# Patient Record
Sex: MALE | Race: WHITE | URBAN - METROPOLITAN AREA
[De-identification: names, ages, dates, MRNs, and addresses within clinical notes are randomized per-mention and may not be internally consistent; named-entity substitution may affect disease eponyms.]

---

## 2017-02-13 ENCOUNTER — OFFICE VISIT (OUTPATIENT)
Dept: PEDIATRICS | Facility: CLINIC | Age: 82
End: 2017-02-13
Payer: COMMERCIAL

## 2017-02-13 VITALS
SYSTOLIC BLOOD PRESSURE: 137 MMHG | OXYGEN SATURATION: 100 % | DIASTOLIC BLOOD PRESSURE: 65 MMHG | HEART RATE: 64 BPM | HEIGHT: 63 IN | BODY MASS INDEX: 23.92 KG/M2 | TEMPERATURE: 97.2 F | WEIGHT: 135 LBS

## 2017-02-13 DIAGNOSIS — E78.5 HYPERLIPIDEMIA LDL GOAL <70: ICD-10-CM

## 2017-02-13 DIAGNOSIS — I10 HTN, GOAL BELOW 150/90: Primary | ICD-10-CM

## 2017-02-13 DIAGNOSIS — D69.3 IDIOPATHIC THROMBOCYTOPENIC PURPURA (H): ICD-10-CM

## 2017-02-13 DIAGNOSIS — Z23 NEED FOR PROPHYLACTIC VACCINATION AND INOCULATION AGAINST INFLUENZA: ICD-10-CM

## 2017-02-13 LAB
ALT SERPL W P-5'-P-CCNC: 20 U/L (ref 0–70)
ANION GAP SERPL CALCULATED.3IONS-SCNC: 4 MMOL/L (ref 3–14)
BASOPHILS # BLD AUTO: 0 10E9/L (ref 0–0.2)
BASOPHILS NFR BLD AUTO: 0.4 %
BUN SERPL-MCNC: 13 MG/DL (ref 7–30)
CALCIUM SERPL-MCNC: 8.9 MG/DL (ref 8.5–10.1)
CHLORIDE SERPL-SCNC: 106 MMOL/L (ref 94–109)
CHOLEST SERPL-MCNC: 138 MG/DL
CO2 SERPL-SCNC: 30 MMOL/L (ref 20–32)
CREAT SERPL-MCNC: 0.92 MG/DL (ref 0.66–1.25)
DIFFERENTIAL METHOD BLD: ABNORMAL
EOSINOPHIL # BLD AUTO: 0.2 10E9/L (ref 0–0.7)
EOSINOPHIL NFR BLD AUTO: 2.6 %
ERYTHROCYTE [DISTWIDTH] IN BLOOD BY AUTOMATED COUNT: 15.3 % (ref 10–15)
GFR SERPL CREATININE-BSD FRML MDRD: 78 ML/MIN/1.7M2
GLUCOSE SERPL-MCNC: 71 MG/DL (ref 70–99)
HCT VFR BLD AUTO: 45.6 % (ref 40–53)
HDLC SERPL-MCNC: 54 MG/DL
HGB BLD-MCNC: 14.9 G/DL (ref 13.3–17.7)
LDLC SERPL CALC-MCNC: 67 MG/DL
LYMPHOCYTES # BLD AUTO: 1.8 10E9/L (ref 0.8–5.3)
LYMPHOCYTES NFR BLD AUTO: 26.2 %
MCH RBC QN AUTO: 31.1 PG (ref 26.5–33)
MCHC RBC AUTO-ENTMCNC: 32.7 G/DL (ref 31.5–36.5)
MCV RBC AUTO: 95 FL (ref 78–100)
MONOCYTES # BLD AUTO: 0.7 10E9/L (ref 0–1.3)
MONOCYTES NFR BLD AUTO: 10.5 %
NEUTROPHILS # BLD AUTO: 4.2 10E9/L (ref 1.6–8.3)
NEUTROPHILS NFR BLD AUTO: 60.3 %
NONHDLC SERPL-MCNC: 84 MG/DL
PLATELET # BLD AUTO: 105 10E9/L (ref 150–450)
POTASSIUM SERPL-SCNC: 4.3 MMOL/L (ref 3.4–5.3)
RBC # BLD AUTO: 4.79 10E12/L (ref 4.4–5.9)
SODIUM SERPL-SCNC: 140 MMOL/L (ref 133–144)
TRIGL SERPL-MCNC: 86 MG/DL
WBC # BLD AUTO: 7 10E9/L (ref 4–11)

## 2017-02-13 PROCEDURE — 85025 COMPLETE CBC W/AUTO DIFF WBC: CPT | Performed by: INTERNAL MEDICINE

## 2017-02-13 PROCEDURE — 80061 LIPID PANEL: CPT | Performed by: INTERNAL MEDICINE

## 2017-02-13 PROCEDURE — 90662 IIV NO PRSV INCREASED AG IM: CPT | Performed by: INTERNAL MEDICINE

## 2017-02-13 PROCEDURE — 84460 ALANINE AMINO (ALT) (SGPT): CPT | Performed by: INTERNAL MEDICINE

## 2017-02-13 PROCEDURE — 99214 OFFICE O/P EST MOD 30 MIN: CPT | Mod: 25 | Performed by: INTERNAL MEDICINE

## 2017-02-13 PROCEDURE — G0008 ADMIN INFLUENZA VIRUS VAC: HCPCS | Performed by: INTERNAL MEDICINE

## 2017-02-13 PROCEDURE — 36415 COLL VENOUS BLD VENIPUNCTURE: CPT | Performed by: INTERNAL MEDICINE

## 2017-02-13 PROCEDURE — 80048 BASIC METABOLIC PNL TOTAL CA: CPT | Performed by: INTERNAL MEDICINE

## 2017-02-13 ASSESSMENT — PAIN SCALES - GENERAL: PAINLEVEL: MILD PAIN (3)

## 2017-02-13 NOTE — PROGRESS NOTES
SUBJECTIVE:                                                    Justin Doan is a 84 year old male who presents to clinic today for the following health issues:      Hyperlipidemia Follow-Up      Rate your low fat/cholesterol diet?: fair    Taking statin?  Yes, no muscle aches from statin    Other lipid medications/supplements?:  none     Hypertension Follow-up      Outpatient blood pressures are not being checked.    Low Salt Diet: no added salt     Vascular Disease Follow-up:  Coronary Artery Disease (CAD)      Chest pain or pressure, left side neck or arm pain: No    Shortness of breath/increased sweats/nausea with exertion: No    Pain in calves walking 1-2 blocks: Sometimes    Worsened or new symptoms since last visit: No    Nitroglycerin use: no    Daily aspirin use: No     Follow Up Memory issues      Amount of exercise or physical activity: None    Problems taking medications regularly: No    Medication side effects: none  Diet: low fat/cholesterol    No chest pain or shortness  Of breath.  Smoking.  Eating well.  No problems with bowels or urine.  Driving.    Not doing much through.  13th of February,   One more.  Memory not intact.        Problem list and histories reviewed & adjusted, as indicated.  Additional history: as documented    Patient Active Problem List   Diagnosis     COPD (chronic obstructive pulmonary disease) (H)     Renal artery stenosis (H)     PAD (peripheral artery disease) (H)     GERD (gastroesophageal reflux disease)     Insulin resistance     Tobacco abuse     Advanced directives, counseling/discussion     Hyperlipidemia LDL goal <70     CVD (cerebrovascular disease)     Health Care Home     Thrombocytopenia (H)     S/P carotid endarterectomy     Pseudophakia, ou     ITP (idiopathic thrombocytopenic purpura)     Abdominal aortic aneurysm (H)     Occlusion of superior mesenteric artery (H)     HTN, goal below 150/90     Short-term memory loss     Posterior vitreous detachment,  bilateral     Conjunctival cyst, left     Past Surgical History   Procedure Laterality Date     Ent surgery       nose surgery to open things up     Replace valve aortic minimally invasive  2/14/2012     Procedure:REPLACE VALVE AORTIC MINIMALLY INVASIVE; Minimally Invasive Aortic Valve Replacement on pump oxygenator , Trans-Esophageal Cardiac Echogram performed by ; Surgeon:EDEL SALAZAR; Location: OR  --- used porcine      Vascular surgery  03/12     stent placed in superior mesenteric artery for acute ischemia     Endarterectomy carotid  12/21/2012     Procedure: ENDARTERECTOMY CAROTID;  Left Carotid  Endarterectomy ;  Surgeon: Gio Villatoro MD;  Location:  OR     Phacoemulsification with standard intraocular lens implant  10/2013     right eye; left eye     Cataract iol, rt/lt         Social History   Substance Use Topics     Smoking status: Current Every Day Smoker     Packs/day: 0.50     Years: 60.00     Types: Cigarettes     Smokeless tobacco: Never Used      Comment: 7 cigs a day     Alcohol use No      Comment: rarely; consumed heavy use prior     Family History   Problem Relation Age of Onset     HEART DISEASE Father      mi age 72     HEART DISEASE Brother      MI age 44         Current Outpatient Prescriptions   Medication Sig Dispense Refill     donepezil (ARICEPT) 5 MG tablet Take 1 tablet (5 mg) by mouth At Bedtime 90 tablet 1     ezetimibe (ZETIA) 10 MG tablet Take 1 tablet (10 mg) by mouth daily For cholesterol control 90 tablet 4     omeprazole (PRILOSEC) 20 MG capsule Take 1 capsule (20 mg) by mouth daily 90 capsule 2     clopidogrel (PLAVIX) 75 MG tablet Take 1 tablet (75 mg) by mouth daily To reduce risk of stroke 90 tablet 3     simvastatin (ZOCOR) 40 MG tablet Take 1 tablet (40 mg) by mouth At Bedtime .  Due for cholesterol check. 90 tablet 4     metoprolol (TOPROL-XL) 100 MG 24 hr tablet Take 1 tablet (100 mg) by mouth daily For heart and blood pressure 90 tablet 3      capsaicin (ZOSTRIX) 0.075 % topical cream Apply topically 3 times daily 120 g 12     Capsicum Oleoresin 0.075 % CREA Apply to feet daily 60 g 12     capsaicin (CAPSAICIN APR) 0.025 % CREA Apply small amount on feet 3 times daily. 45 g 12     carboxymethylcellulose (REFRESH) 1 % ophthalmic solution 1 drop 3 times daily.       VITAMIN D 1000 UNIT OR TABS 1 TABLET DAILY 90 3     No Known Allergies  Recent Labs   Lab Test  02/10/16   1313  06/04/15   1005  02/25/15   1104  10/24/14   1045   08/14/13   0739   11/14/12   1018  08/14/12   1004   01/26/12   1430   07/06/11   0745   09/13/10   0956   A1C  5.9   --    --    --    --    --    --    --   5.9   --   5.8   < >  6.0   < >   --    LDL  62   --   98  75   --   79   < >  71  75   --    --    < >  68   < >   --    HDL   --    --    --    --    --   38*   --   37*   --    --    --    --   38*   < >   --    TRIG   --    --    --    --    --   69   --   84   --    --    --    --   54   < >   --    ALT   --    --   19  17   --   21   --   17  7   < >   --    < >  9   < >   --    CR  0.97  0.97  0.96  1.05   < >  1.05   < >  0.95  1.02   < >   --    < >  0.97   < >   --    GFRESTIMATED  74  74  75  68   < >  68   < >  76  70   < >   --    < >  75   < >   --    GFRESTBLACK  90  90  >90   GFR Calc    82   < >  82   < >  >90  85   < >   --    < >  >90   < >   --    POTASSIUM  4.3  4.4  4.6  5.1   < >  5.1   < >  4.2  5.0   < >   --    < >  4.9   < >   --    TSH   --   0.77   --    --    --    --    --    --    --    --    --    --    --    --   0.47    < > = values in this interval not displayed.      BP Readings from Last 3 Encounters:   02/13/17 137/65   08/12/16 131/72   05/10/16 145/75    Wt Readings from Last 3 Encounters:   02/13/17 135 lb (61.2 kg)   08/12/16 134 lb (60.8 kg)   05/10/16 142 lb (64.4 kg)                    ROS:  Constitutional, HEENT, cardiovascular, pulmonary, gi and gu systems are negative, except as otherwise noted.    OBJECTIVE:   "                                                  /65 (BP Location: Right arm, Patient Position: Chair, Cuff Size: Adult Regular)  Pulse 64  Temp 97.2  F (36.2  C) (Oral)  Ht 5' 2.5\" (1.588 m)  Wt 135 lb (61.2 kg)  SpO2 100%  BMI 24.3 kg/m2  Body mass index is 24.3 kg/(m^2).  GENERAL: healthy, alert and no distress  NECK: no adenopathy, no asymmetry, masses, or scars and thyroid normal to palpation  RESP: lungs clear to auscultation - no rales, rhonchi or wheezes  CV: regular rate and rhythm, normal S1 S2, no S3 or S4, no murmur, click or rub, no peripheral edema and peripheral pulses strong  ABDOMEN: soft, nontender, no hepatosplenomegaly, no masses and bowel sounds normal  MS: no gross musculoskeletal defects noted, no edema    Diagnostic Test Results:  Results for orders placed or performed in visit on 02/13/17   CBC with platelets and differential   Result Value Ref Range    WBC 7.0 4.0 - 11.0 10e9/L    RBC Count 4.79 4.4 - 5.9 10e12/L    Hemoglobin 14.9 13.3 - 17.7 g/dL    Hematocrit 45.6 40.0 - 53.0 %    MCV 95 78 - 100 fl    MCH 31.1 26.5 - 33.0 pg    MCHC 32.7 31.5 - 36.5 g/dL    RDW 15.3 (H) 10.0 - 15.0 %    Platelet Count 105 (L) 150 - 450 10e9/L    Diff Method Automated Method     % Neutrophils 60.3 %    % Lymphocytes 26.2 %    % Monocytes 10.5 %    % Eosinophils 2.6 %    % Basophils 0.4 %    Absolute Neutrophil 4.2 1.6 - 8.3 10e9/L    Absolute Lymphocytes 1.8 0.8 - 5.3 10e9/L    Absolute Monocytes 0.7 0.0 - 1.3 10e9/L    Absolute Eosinophils 0.2 0.0 - 0.7 10e9/L    Absolute Basophils 0.0 0.0 - 0.2 10e9/L        ASSESSMENT/PLAN:                                                              ICD-10-CM    1. HTN, goal below 150/90 I10 Basic metabolic panel   2. Hyperlipidemia LDL goal <70 E78.5 ALT     Lipid panel reflex to direct LDL     CANCELED: Basic metabolic panel     CANCELED: Lipid panel reflex to direct LDL     CANCELED: ALT   3. Idiopathic thrombocytopenic purpura (H) D69.3 CBC with platelets " and differential   4. Need for prophylactic vaccination and inoculation against influenza Z23 FLU VACCINE, INCREASED ANTIGEN, PRESV FREE, AGE 65+ [35897]     Vaccine Administration, Initial [68291]       Patient in need of labs  Patient still with short term memory loss.  I am concerned about his driving and memory.  Seems safe at home with access to food, clothing and keeps appointments.  Patient still smoking and there is a hint of alcohol odor today as well.      Arturo Ware MD  [facility]

## 2017-02-13 NOTE — NURSING NOTE
"Chief Complaint   Patient presents with     RECHECK     Memory issues     COPD     Hypertension     Lipids     Heart Problem       Initial /65 (BP Location: Right arm, Patient Position: Chair, Cuff Size: Adult Regular)  Pulse 64  Temp 97.2  F (36.2  C) (Oral)  Ht 5' 2.5\" (1.588 m)  Wt 135 lb (61.2 kg)  SpO2 100%  BMI 24.3 kg/m2 Estimated body mass index is 24.3 kg/(m^2) as calculated from the following:    Height as of this encounter: 5' 2.5\" (1.588 m).    Weight as of this encounter: 135 lb (61.2 kg).  Medication Reconciliation: complete, but referred to [facility] due to patient's memory issues.  Yasmeen Chaudhary MA      "

## 2017-02-13 NOTE — PROGRESS NOTES
Injectable Influenza Immunization Documentation    1.  Is the person to be vaccinated sick today?  No    2. Does the person to be vaccinated have an allergy to eggs or to a component of the vaccine?  No    3. Has the person to be vaccinated today ever had a serious reaction to influenza vaccine in the past?  No    4. Has the person to be vaccinated ever had Guillain-[place] syndrome?  No     Form completed by Carmella Raygoza CMA

## 2017-02-13 NOTE — MR AVS SNAPSHOT
"              After Visit Summary   2017    Justin Doan    MRN: [id number]           Patient Information     Date Of Birth          1932        Visit Information        Provider Department      2017 9:00 AM Arturo Ware MD [facility]        Today's Diagnoses     HTN, goal below 150/90    -  1    Hyperlipidemia LDL goal <70        Idiopathic thrombocytopenic purpura (H)           Follow-ups after your visit        Follow-up notes from your care team     Return in about 3 months (around 2017).      Who to contact     If you have questions or need follow up information about today's clinic visit or your schedule please contact [facility] directly at [phone].  Normal or non-critical lab and imaging results will be communicated to you by MyChart, letter or phone within 4 business days after the clinic has received the results. If you do not hear from us within 7 days, please contact the clinic through MyChart or phone. If you have a critical or abnormal lab result, we will notify you by phone as soon as possible.  Submit refill requests through [employer] or call your pharmacy and they will forward the refill request to us. Please allow 3 business days for your refill to be completed.          Additional Information About Your Visit        MyChart Information     [employer] lets you send messages to your doctor, view your test results, renew your prescriptions, schedule appointments and more. To sign up, go to [email or web address] . Click on \"Log in\" on the left side of the screen, which will take you to the Welcome page. Then click on \"Sign up Now\" on the right side of the page.     You will be asked to enter the access code listed below, as well as some personal information. Please follow the directions to create your username and password.     Your access code is: [email or web address]  Expires: 2017  9:23 AM     Your access code will  in " "90 days. If you need help or a new code, please call your [facility] or [phone].        Care EveryWhere ID     This is your Care EveryWhere ID. This could be used by other organizations to access your [place] medical records  [id number]        Your Vitals Were     Pulse Temperature Height Pulse Oximetry BMI (Body Mass Index)       64 97.2  F (36.2  C) (Oral) 5' 2.5\" (1.588 m) 100% 24.3 kg/m2        Blood Pressure from Last 3 Encounters:   02/13/17 137/65   08/12/16 131/72   05/10/16 145/75    Weight from Last 3 Encounters:   02/13/17 135 lb (61.2 kg)   08/12/16 134 lb (60.8 kg)   05/10/16 142 lb (64.4 kg)              We Performed the Following     ALT     Basic metabolic panel     CBC with platelets and differential     Lipid panel reflex to direct LDL        Primary Care Provider Office Phone # Fax #    Arturo Ware [phone]       [facility] [address] [facility] [address]        Thank you!     Thank you for choosing [facility]  for your care. Our goal is always to provide you with excellent care. Hearing back from our patients is one way we can continue to improve our services. Please take a few minutes to complete the written survey that you may receive in the mail after your visit with us. Thank you!             Your Updated Medication List - Protect others around you: Learn how to safely use, store and throw away your medicines at www.disposemymeds.org.          This list is accurate as of: 2/13/17  9:23 AM.  Always use your most recent med list.                   Brand Name Dispense Instructions for use    * capsaicin 0.025 % Crea cream    CAPSAICIN APR    45 g    Apply small amount on feet 3 times daily.       * capsaicin 0.075 % cream    ZOSTRIX    120 g    Apply topically 3 times daily       Capsicum Oleoresin 0.075 % Crea     60 g    Apply to feet daily       cholecalciferol 1000 UNIT tablet    vitamin D    90    1 " TABLET DAILY       clopidogrel 75 MG tablet    PLAVIX    90 tablet    Take 1 tablet (75 mg) by mouth daily To reduce risk of stroke       donepezil 5 MG tablet    ARICEPT    90 tablet    Take 1 tablet (5 mg) by mouth At Bedtime       ezetimibe 10 MG tablet    ZETIA    90 tablet    Take 1 tablet (10 mg) by mouth daily For cholesterol control       metoprolol 100 MG 24 hr tablet    TOPROL-XL    90 tablet    Take 1 tablet (100 mg) by mouth daily For heart and blood pressure       omeprazole 20 MG CR capsule    priLOSEC    90 capsule    Take 1 capsule (20 mg) by mouth daily       REFRESH 1 % ophthalmic solution   Generic drug:  carboxymethylcellulose      1 drop 3 times daily.       simvastatin 40 MG tablet    ZOCOR    90 tablet    Take 1 tablet (40 mg) by mouth At Bedtime .  Due for cholesterol check.       * Notice:  This list has 2 medication(s) that are the same as other medications prescribed for you. Read the directions carefully, and ask your doctor or other care provider to review them with you.

## 2017-03-08 DIAGNOSIS — R41.3 SHORT-TERM MEMORY LOSS: ICD-10-CM

## 2017-03-08 NOTE — TELEPHONE ENCOUNTER
donepezil (ARICEPT) 5 MG tablet      Last Written Prescription Date: 8/12/16  Last Fill Quantity: 90,  # refills: 1   Last Office Visit with [facility], [facility] or [facility] prescribing provider: 2/13/17                                         Next 5 appointments (look out 90 days)     May 15, 2017  9:00 AM CDT   SHORT with Arturo Ware MD   [facility]    [address] [place] [id number] [phone]

## 2017-03-09 RX ORDER — DONEPEZIL HYDROCHLORIDE 5 MG/1
TABLET, FILM COATED ORAL
Qty: 90 TABLET | Refills: 1 | Status: SHIPPED | OUTPATIENT
Start: 2017-03-09 | End: 2017-05-15

## 2017-05-11 DIAGNOSIS — K21.9 GASTROESOPHAGEAL REFLUX DISEASE WITHOUT ESOPHAGITIS: ICD-10-CM

## 2017-05-11 DIAGNOSIS — I51.7 LVH (LEFT VENTRICULAR HYPERTROPHY): ICD-10-CM

## 2017-05-11 RX ORDER — METOPROLOL SUCCINATE 100 MG/1
TABLET, EXTENDED RELEASE ORAL
Qty: 90 TABLET | Refills: 2 | Status: SHIPPED | OUTPATIENT
Start: 2017-05-11 | End: 2017-10-19

## 2017-05-11 NOTE — TELEPHONE ENCOUNTER
Prescription approved per [facility] Refill Protocol.    Kathy Hong RN  [facility]

## 2017-05-11 NOTE — TELEPHONE ENCOUNTER
omeprazole (PRILOSEC) 20 MG capsule      Last Written Prescription Date: 8-12-16  Last Fill Quantity: 90,  # refills: 2   Last Office Visit with [facility] or [facility] prescribing provider: 2-13-17                                         Next 5 appointments (look out 90 days)     May 15, 2017  9:00 AM CDT   SHORT with Arturo Ware MD   [facility]    [address] [facility] [id number] [phone]                    metoprolol (TOPROL-XL) 100 MG 24 hr tablet      Last Written Prescription Date: 5-10-16  Last Fill Quantity: 90, # refills: 3    Last Office Visit with [facility] or [facility] prescribing provider:  2-13-17   Future Office Visit:    Next 5 appointments (look out 90 days)     May 15, 2017  9:00 AM CDT   SHORT with Arturo Ware MD   [facility]    [address] [facility] [id number] [phone]                    BP Readings from Last 3 Encounters:   02/13/17 137/65   08/12/16 131/72   05/10/16 145/75

## 2017-05-15 ENCOUNTER — OFFICE VISIT (OUTPATIENT)
Dept: PEDIATRICS | Facility: CLINIC | Age: 82
End: 2017-05-15
Payer: COMMERCIAL

## 2017-05-15 VITALS
DIASTOLIC BLOOD PRESSURE: 62 MMHG | WEIGHT: 135 LBS | TEMPERATURE: 97.5 F | HEART RATE: 60 BPM | SYSTOLIC BLOOD PRESSURE: 133 MMHG | OXYGEN SATURATION: 98 % | BODY MASS INDEX: 24.3 KG/M2

## 2017-05-15 DIAGNOSIS — F17.200 NEEDS SMOKING CESSATION EDUCATION: ICD-10-CM

## 2017-05-15 DIAGNOSIS — M19.079 ANKLE ARTHRITIS: ICD-10-CM

## 2017-05-15 DIAGNOSIS — I67.9 CVD (CEREBROVASCULAR DISEASE): ICD-10-CM

## 2017-05-15 DIAGNOSIS — R41.3 SHORT-TERM MEMORY LOSS: ICD-10-CM

## 2017-05-15 DIAGNOSIS — E78.5 HYPERLIPIDEMIA LDL GOAL <70: ICD-10-CM

## 2017-05-15 PROCEDURE — 99214 OFFICE O/P EST MOD 30 MIN: CPT | Performed by: INTERNAL MEDICINE

## 2017-05-15 RX ORDER — SIMVASTATIN 40 MG
40 TABLET ORAL AT BEDTIME
Qty: 90 TABLET | Refills: 4 | Status: SHIPPED | OUTPATIENT
Start: 2017-05-15

## 2017-05-15 RX ORDER — CAPSAICIN 0.75 MG/G
CREAM TOPICAL 3 TIMES DAILY
Qty: 120 G | Refills: 12 | Status: SHIPPED | OUTPATIENT
Start: 2017-05-15 | End: 2017-08-14

## 2017-05-15 RX ORDER — CLOPIDOGREL BISULFATE 75 MG/1
75 TABLET ORAL DAILY
Qty: 90 TABLET | Refills: 3 | Status: SHIPPED | OUTPATIENT
Start: 2017-05-15

## 2017-05-15 RX ORDER — EZETIMIBE 10 MG/1
10 TABLET ORAL DAILY
Qty: 90 TABLET | Refills: 4 | Status: SHIPPED | OUTPATIENT
Start: 2017-05-15 | End: 2017-10-19

## 2017-05-15 RX ORDER — DONEPEZIL HYDROCHLORIDE 5 MG/1
TABLET, FILM COATED ORAL
Qty: 90 TABLET | Refills: 1 | Status: SHIPPED | OUTPATIENT
Start: 2017-05-15

## 2017-05-15 ASSESSMENT — PAIN SCALES - GENERAL: PAINLEVEL: NO PAIN (0)

## 2017-05-15 NOTE — MR AVS SNAPSHOT
After Visit Summary   5/15/2017    Justin Doan    MRN: [id number]           Patient Information     Date Of Birth          11/14/1932        Visit Information        Provider Department      5/15/2017 9:00 AM Arturo Ware MD [facility]        Today's Diagnoses     Short-term memory loss        Hyperlipidemia LDL goal <70        CVD (cerebrovascular disease)        Needs smoking cessation education        Ankle arthritis          Care Instructions     Assessment and Training Services  For many of us, being able to drive means independence, mobility and a sense of control in our lives. [facility] s unique  Assessment and Training service consists of both assessment and training.   New or experienced drivers with visual, cognitive, physical, medical challenges, changes due to aging, disability or mental health issues may benefit from our services. For more than 35 years, [facility] has been the leader in  assessment and training for seniors and people with disabilities.  To make an appointment   call [phone]   .  Assessment Services  The two-part comprehensive  assessment consists of clinical and in-vehicle assessments.  Clinical assessment  Vision testing   Reaction time screening   Memory and problem solving   Upper and lower body strength and coordination   Cognitive processing skills  In-vehicle services  Assessment in a minivan or sedan   Prescription for adaptive driving equipment   Private driving lessons in a van or sedan   Vehicle use for the State road test.  All information is kept confidential, but we encourage involvement with your physician and family. With your permission, your physician is given a copy of your completed assessment results.  Designed for seniors and people with:  Alzheimer s disease or dementia   Amputation   Autism spectrum disorders   Congenital  disabilities   Degenerative neurological conditions   Diabetes   Learning and developmental disabilities   Spinal cord injury   Stroke   Traumatic brain injury   Visual impairment   Mental health disorder.  Potential outcomes of an assessment  Recommendations generally fall into one of the following categories:  Continue driving   Continue driving with restrictions, such as no night driving, no rush hour driving, or driving only within a determined distance from your home.   Continue driving with vehicle modifications. Lessons are recommended to further develop skill and safety in using any new equipment and to prepare the client for the State road test.   Driving lessons   Referral to therapy to determine if skills required for driving can be improved. Therapy may include physical, occupational, vision, behavioral or other. An extended assessment may be recommended to establish consistency in driving safety   Discontinue driving. A list of alternative transportation options will be provided.   Funding and more information   Assessment and Training services typically are self-pay. In some cases, funding may be available through the [facility] s Division of Vocational Rehabilitation Services, workers  compensation, school systems and Medicaid waivers. A discount on driving services is available for individuals on Medicaid.          Follow-ups after your visit        Who to contact     If you have questions or need follow up information about today's clinic visit or your schedule please contact [facility] directly at [phone].  Normal or non-critical lab and imaging results will be communicated to you by MyChart, letter or phone within 4 business days after the clinic has received the results. If you do not hear from us within 7 days, please contact the clinic through MyChart or phone. If you have a critical or abnormal lab result, we will notify you by phone as soon as possible.  Submit  "refill requests through [employer] or call your pharmacy and they will forward the refill request to us. Please allow 3 business days for your refill to be completed.          Additional Information About Your Visit        [employer] Information     [employer] lets you send messages to your doctor, view your test results, renew your prescriptions, schedule appointments and more. To sign up, go to [email or web address] . Click on \"Log in\" on the left side of the screen, which will take you to the Welcome page. Then click on \"Sign up Now\" on the right side of the page.     You will be asked to enter the access code listed below, as well as some personal information. Please follow the directions to create your username and password.     Your access code is: [email or web address]  Expires: 2017  9:41 AM     Your access code will  in 90 days. If you need help or a new code, please call your [place] clinic or [phone].        Care EveryWhere ID     This is your Care EveryWhere ID. This could be used by other organizations to access your [place] medical records  [id number]        Your Vitals Were     Pulse Temperature Pulse Oximetry BMI (Body Mass Index)          60 97.5  F (36.4  C) (Oral) 98% 24.3 kg/m2         Blood Pressure from Last 3 Encounters:   05/15/17 133/62   17 137/65   16 131/72    Weight from Last 3 Encounters:   05/15/17 135 lb (61.2 kg)   17 135 lb (61.2 kg)   16 134 lb (60.8 kg)              Today, you had the following     No orders found for display         Today's Medication Changes          These changes are accurate as of: 5/15/17  9:41 AM.  If you have any questions, ask your nurse or doctor.               These medicines have changed or have updated prescriptions.        Dose/Directions    capsaicin 0.075 % cream   Commonly known as:  ZOSTRIX   This may have changed:  Another medication with the same name was removed. Continue taking this medication, and follow the " directions you see here.   Used for:  Needs smoking cessation education, Ankle arthritis   Changed by:  Arturo Ware MD        Apply topically 3 times daily   Quantity:  120 g   Refills:  12       donepezil 5 MG tablet   Commonly known as:  ARIcept   This may have changed:  See the new instructions.   Used for:  Short-term memory loss   Changed by:  Arturo Ware MD        TAKE ONE TABLET BY MOUTH AT BEDTIME   Quantity:  90 tablet   Refills:  1         Stop taking these medicines if you haven't already. Please contact your care team if you have questions.     Capsicum Oleoresin 0.075 % Crea   Stopped by:  Arturo Ware MD                Where to get your medicines      These medications were sent to [facility] [address] [place] [id number]     Phone:  [phone]     capsaicin 0.075 % cream    clopidogrel 75 MG tablet    donepezil 5 MG tablet    ezetimibe 10 MG tablet    simvastatin 40 MG tablet                Primary Care Provider Office Phone # Fax #    Arturo Ware [phone]       [address] [facility] [address]        Thank you!     Thank you for choosing [facility]  for your care. Our goal is always to provide you with excellent care. Hearing back from our patients is one way we can continue to improve our services. Please take a few minutes to complete the written survey that you may receive in the mail after your visit with us. Thank you!             Your Updated Medication List - Protect others around you: Learn how to safely use, store and throw away your medicines at www.disposemymeds.org.          This list is accurate as of: 5/15/17  9:41 AM.  Always use your most recent med list.                   Brand Name Dispense Instructions for use    capsaicin 0.075 % cream    ZOSTRIX    120 g    Apply topically 3 times daily       cholecalciferol  1000 UNIT tablet    vitamin D    90    1 TABLET DAILY       clopidogrel 75 MG tablet    PLAVIX    90 tablet    Take 1 tablet (75 mg) by mouth daily To reduce risk of stroke       donepezil 5 MG tablet    ARIcept    90 tablet    TAKE ONE TABLET BY MOUTH AT BEDTIME       ezetimibe 10 MG tablet    ZETIA    90 tablet    Take 1 tablet (10 mg) by mouth daily For cholesterol control       metoprolol 100 MG 24 hr tablet    TOPROL-XL    90 tablet    TAKE 1 TABLET (100 MG) BY MOUTH DAILY FOR HEART AND BLOOD PRESSURE       omeprazole 20 MG CR capsule    priLOSEC    90 capsule    TAKE 1 CAPSULE (20 MG) BY MOUTH DAILY       REFRESH 1 % ophthalmic solution   Generic drug:  carboxymethylcellulose      1 drop 3 times daily.       simvastatin 40 MG tablet    ZOCOR    90 tablet    Take 1 tablet (40 mg) by mouth At Bedtime .  Due for cholesterol check.

## 2017-05-15 NOTE — NURSING NOTE
"Chief Complaint   Patient presents with     Hypertension     Lipids       Initial /62 (BP Location: Right arm, Patient Position: Chair, Cuff Size: Adult Regular)  Pulse 60  Temp 97.5  F (36.4  C) (Oral)  Wt 135 lb (61.2 kg)  SpO2 98%  BMI 24.3 kg/m2 Estimated body mass index is 24.3 kg/(m^2) as calculated from the following:    Height as of 2/13/17: 5' 2.5\" (1.588 m).    Weight as of this encounter: 135 lb (61.2 kg).  Medication Reconciliation: complete  Yasmeen Chaudhary MA    "

## 2017-05-15 NOTE — PATIENT INSTRUCTIONS
Assessment and Training Services  For many of us, being able to drive means independence, mobility and a sense of control in our lives. [facility] s unique  Assessment and Training service consists of both assessment and training.   New or experienced drivers with visual, cognitive, physical, medical challenges, changes due to aging, disability or mental health issues may benefit from our services. For more than 35 years, [facility] has been the leader in  assessment and training for seniors and people with disabilities.  To make an appointment   call [phone]   .  Assessment Services  The two-part comprehensive  assessment consists of clinical and in-vehicle assessments.  Clinical assessment  Vision testing   Reaction time screening   Memory and problem solving   Upper and lower body strength and coordination   Cognitive processing skills  In-vehicle services  Assessment in a minivan or sedan   Prescription for adaptive driving equipment   Private driving lessons in a van or sedan   Vehicle use for the State road test.  All information is kept confidential, but we encourage involvement with your physician and family. With your permission, your physician is given a copy of your completed assessment results.  Designed for seniors and people with:  Alzheimer s disease or dementia   Amputation   Autism spectrum disorders   Congenital disabilities   Degenerative neurological conditions   Diabetes   Learning and developmental disabilities   Spinal cord injury   Stroke   Traumatic brain injury   Visual impairment   Mental health disorder.  Potential outcomes of an assessment  Recommendations generally fall into one of the following categories:  Continue driving   Continue driving with restrictions, such as no night driving, no rush hour driving, or driving only within a determined distance from your home.   Continue driving with vehicle  modifications. Lessons are recommended to further develop skill and safety in using any new equipment and to prepare the client for the State road test.   Driving lessons   Referral to therapy to determine if skills required for driving can be improved. Therapy may include physical, occupational, vision, behavioral or other. An extended assessment may be recommended to establish consistency in driving safety   Discontinue driving. A list of alternative transportation options will be provided.   Funding and more information   Assessment and Training services typically are self-pay. In some cases, funding may be available through the State s Division of Vocational Rehabilitation Services, workers  compensation, school systems and Medicaid waivers. A discount on driving services is available for individuals on Medicaid.

## 2017-05-15 NOTE — PROGRESS NOTES
SUBJECTIVE:                                                    Justin Doan is a 84 year old male who presents to clinic today for the following health issues:      Hyperlipidemia Follow-Up      Rate your low fat/cholesterol diet?: fair    Taking statin?  Yes, no muscle aches from statin    Other lipid medications/supplements?:  none     Hypertension Follow-up      Outpatient blood pressures are not being checked.    Low Salt Diet: no added salt       Amount of exercise or physical activity: None    Problems taking medications regularly: No    Medication side effects: none    Diet: regular (no restrictions) and low salt    Driving.  At home everywhere.  Not getting lost with driving  Managing daily activities with eating, dressing.      Color is improved.   Still smoking, claims shallow intake.            Problem list and histories reviewed & adjusted, as indicated.  Additional history: as documented    Patient Active Problem List   Diagnosis     COPD (chronic obstructive pulmonary disease) (H)     Renal artery stenosis (H)     PAD (peripheral artery disease) (H)     GERD (gastroesophageal reflux disease)     Insulin resistance     Tobacco abuse     Advanced directives, counseling/discussion     Hyperlipidemia LDL goal <70     CVD (cerebrovascular disease)     Health Care Home     Thrombocytopenia (H)     S/P carotid endarterectomy     Pseudophakia, ou     ITP (idiopathic thrombocytopenic purpura)     Abdominal aortic aneurysm (H)     Occlusion of superior mesenteric artery (H)     HTN, goal below 150/90     Short-term memory loss     Posterior vitreous detachment, bilateral     Conjunctival cyst, left     Past Surgical History:   Procedure Laterality Date     CATARACT IOL, RT/LT       ENDARTERECTOMY CAROTID  12/21/2012    Procedure: ENDARTERECTOMY CAROTID;  Left Carotid  Endarterectomy ;  Surgeon: Gio Villatoro MD;  Location: [facility]     ENT SURGERY      nose surgery to open things up     PHACOEMULSIFICATION  WITH STANDARD INTRAOCULAR LENS IMPLANT  10/2013    right eye; left eye     REPLACE VALVE AORTIC MINIMALLY INVASIVE  2/14/2012    Procedure:REPLACE VALVE AORTIC MINIMALLY INVASIVE; Minimally Invasive Aortic Valve Replacement on pump oxygenator , Trans-Esophageal Cardiac Echogram performed by ; Surgeon:EDEL SALAZAR; Location: OR  --- used porcine      VASCULAR SURGERY  03/12    stent placed in superior mesenteric artery for acute ischemia       Social History   Substance Use Topics     Smoking status: Current Every Day Smoker     Packs/day: 0.50     Years: 60.00     Types: Cigarettes     Smokeless tobacco: Never Used      Comment: 7 cigs a day     Alcohol use No      Comment: rarely; consumed heavy use prior     Family History   Problem Relation Age of Onset     HEART DISEASE Father      mi age 72     HEART DISEASE Brother      MI age 44         Current Outpatient Prescriptions   Medication Sig Dispense Refill     donepezil (ARICEPT) 5 MG tablet TAKE ONE TABLET BY MOUTH AT BEDTIME 90 tablet 1     ezetimibe (ZETIA) 10 MG tablet Take 1 tablet (10 mg) by mouth daily For cholesterol control 90 tablet 4     clopidogrel (PLAVIX) 75 MG tablet Take 1 tablet (75 mg) by mouth daily To reduce risk of stroke 90 tablet 3     simvastatin (ZOCOR) 40 MG tablet Take 1 tablet (40 mg) by mouth At Bedtime .  Due for cholesterol check. 90 tablet 4     capsaicin (ZOSTRIX) 0.075 % cream Apply topically 3 times daily 120 g 12     omeprazole (PRILOSEC) 20 MG CR capsule TAKE 1 CAPSULE (20 MG) BY MOUTH DAILY 90 capsule 1     metoprolol (TOPROL-XL) 100 MG 24 hr tablet TAKE 1 TABLET (100 MG) BY MOUTH DAILY FOR HEART AND BLOOD PRESSURE 90 tablet 2     [DISCONTINUED] ezetimibe (ZETIA) 10 MG tablet Take 1 tablet (10 mg) by mouth daily For cholesterol control 90 tablet 4     [DISCONTINUED] clopidogrel (PLAVIX) 75 MG tablet Take 1 tablet (75 mg) by mouth daily To reduce risk of stroke 90 tablet 3     [DISCONTINUED] simvastatin  (ZOCOR) 40 MG tablet Take 1 tablet (40 mg) by mouth At Bedtime .  Due for cholesterol check. 90 tablet 4     carboxymethylcellulose (REFRESH) 1 % ophthalmic solution 1 drop 3 times daily.       VITAMIN D 1000 UNIT OR TABS 1 TABLET DAILY 90 3     No Known Allergies  Recent Labs   Lab Test  02/13/17   0928  02/10/16   1313  06/04/15   1005  02/25/15   1104  10/24/14   1045   08/14/13   0739   11/14/12   1018  08/14/12   1004   01/26/12   1430   09/13/10   0956   A1C   --   5.9   --    --    --    --    --    --    --   5.9   --   5.8   < >   --    LDL  67  62   --   98  75   --   79   < >  71  75   --    --    < >   --    HDL  54   --    --    --    --    --   38*   --   37*   --    --    --    < >   --    TRIG  86   --    --    --    --    --   69   --   84   --    --    --    < >   --    ALT  20   --    --   19  17   --   21   --   17  7   < >   --    < >   --    CR  0.92  0.97  0.97  0.96  1.05   < >  1.05   < >  0.95  1.02   < >   --    < >   --    GFRESTIMATED  78  74  74  75  68   < >  68   < >  76  70   < >   --    < >   --    GFRESTBLACK  >90   GFR Calc    90  90  >90   GFR Calc    82   < >  82   < >  >90  85   < >   --    < >   --    POTASSIUM  4.3  4.3  4.4  4.6  5.1   < >  5.1   < >  4.2  5.0   < >   --    < >   --    TSH   --    --   0.77   --    --    --    --    --    --    --    --    --    --   0.47    < > = values in this interval not displayed.        Reviewed and updated as needed this visit by clinical staff  Tobacco  Allergies  Meds  Med Hx  Surg Hx  Fam Hx  Soc Hx      Reviewed and updated as needed this visit by Provider         ROS:  Constitutional, HEENT, cardiovascular, pulmonary, gi and gu systems are negative, except as otherwise noted.    OBJECTIVE:                                                    /62 (BP Location: Right arm, Patient Position: Chair, Cuff Size: Adult Regular)  Pulse 60  Temp 97.5  F (36.4  C) (Oral)  Wt 135 lb (61.2 kg)   SpO2 98%  BMI 24.3 kg/m2  Body mass index is 24.3 kg/(m^2).  GENERAL: healthy, alert and no distress  NECK: no adenopathy, no asymmetry, masses, or scars and thyroid normal to palpation  RESP: lungs clear to auscultation - no rales, rhonchi or wheezes  CV: regular rate and rhythm, normal S1 S2, no S3 or S4, no murmur, click or rub, no peripheral edema and peripheral pulses strong  ABDOMEN: soft, nontender, no hepatosplenomegaly, no masses and bowel sounds normal  MS: no gross musculoskeletal defects noted, no edema    Diagnostic Test Results:  Results for orders placed or performed in visit on 02/13/17   ALT   Result Value Ref Range    ALT 20 0 - 70 U/L   Basic metabolic panel   Result Value Ref Range    Sodium 140 133 - 144 mmol/L    Potassium 4.3 3.4 - 5.3 mmol/L    Chloride 106 94 - 109 mmol/L    Carbon Dioxide 30 20 - 32 mmol/L    Anion Gap 4 3 - 14 mmol/L    Glucose 71 70 - 99 mg/dL    Urea Nitrogen 13 7 - 30 mg/dL    Creatinine 0.92 0.66 - 1.25 mg/dL    GFR Estimate 78 >60 mL/min/1.7m2    GFR Estimate If Black >90   GFR Calc   >60 mL/min/1.7m2    Calcium 8.9 8.5 - 10.1 mg/dL   Lipid panel reflex to direct LDL   Result Value Ref Range    Cholesterol 138 <200 mg/dL    Triglycerides 86 <150 mg/dL    HDL Cholesterol 54 >39 mg/dL    LDL Cholesterol Calculated 67 <100 mg/dL    Non HDL Cholesterol 84 <130 mg/dL   CBC with platelets and differential   Result Value Ref Range    WBC 7.0 4.0 - 11.0 10e9/L    RBC Count 4.79 4.4 - 5.9 10e12/L    Hemoglobin 14.9 13.3 - 17.7 g/dL    Hematocrit 45.6 40.0 - 53.0 %    MCV 95 78 - 100 fl    MCH 31.1 26.5 - 33.0 pg    MCHC 32.7 31.5 - 36.5 g/dL    RDW 15.3 (H) 10.0 - 15.0 %    Platelet Count 105 (L) 150 - 450 10e9/L    Diff Method Automated Method     % Neutrophils 60.3 %    % Lymphocytes 26.2 %    % Monocytes 10.5 %    % Eosinophils 2.6 %    % Basophils 0.4 %    Absolute Neutrophil 4.2 1.6 - 8.3 10e9/L    Absolute Lymphocytes 1.8 0.8 - 5.3 10e9/L    Absolute  "Monocytes 0.7 0.0 - 1.3 10e9/L    Absolute Eosinophils 0.2 0.0 - 0.7 10e9/L    Absolute Basophils 0.0 0.0 - 0.2 10e9/L        ASSESSMENT/PLAN:                                                        ICD-10-CM    1. Short-term memory loss R41.3 donepezil (ARICEPT) 5 MG tablet   2. Hyperlipidemia LDL goal <70 E78.5 ezetimibe (ZETIA) 10 MG tablet     simvastatin (ZOCOR) 40 MG tablet   3. CVD (cerebrovascular disease) I67.9 clopidogrel (PLAVIX) 75 MG tablet   4. Needs smoking cessation education F17.200 capsaicin (ZOSTRIX) 0.075 % cream   5. Ankle arthritis M19.90 capsaicin (ZOSTRIX) 0.075 % cream       I am concerned that patient is still driving.  Has fairly substantial short term memory problems but gets here on time to his scheduled appointments without help  Goes shopping and takes medications regularly  Some of the memory issue is hearing related.    However, I will call his family to review options for driving assessment ( [facility])    Discussed with patient who states that \"he is fine:\"  With his driving.      Arturo Ware MD  [facility]    "

## 2017-05-18 ENCOUNTER — TELEPHONE (OUTPATIENT)
Dept: PEDIATRICS | Facility: CLINIC | Age: 82
End: 2017-05-18

## 2017-05-18 NOTE — TELEPHONE ENCOUNTER
PA is needed for the medication, Capsaicin Cream 0.025 %.     Insurance has been called.  Alternatives that are covered are: Benefit Coverage Review needed to cover this medication    Would you like to start PA or Rx alternative medication?    If PA, please list all medications this patient has tried along with any contraindications that may have been experienced in the past. Thank you.    Route back to COMPA WATKINS    Pharmacy: [facility]  Phone: [phone]    Insurance Plan: [employer]  Phone: [phone]  Pt ID: [id number]  Group:     Forwarded to Dr. Ware for review.  Yasmeen Watkins MA

## 2017-05-22 NOTE — TELEPHONE ENCOUNTER
Received fax from insurance stating that the PA was DENIED.     This medication is not covered at all.    Routing to PCP.    Yasmeen Chaudhary MA

## 2017-05-24 NOTE — TELEPHONE ENCOUNTER
Called pharmacy and informed them that PA was denied and that the pt will have to buy the medication over the counter.   Yasmeen Chaudhary MA

## 2017-07-19 ENCOUNTER — CARE COORDINATION (OUTPATIENT)
Dept: CARE COORDINATION | Facility: CLINIC | Age: 82
End: 2017-07-19

## 2017-07-27 ENCOUNTER — TELEPHONE (OUTPATIENT)
Dept: FAMILY MEDICINE | Facility: CLINIC | Age: 82
End: 2017-07-27

## 2017-07-27 NOTE — TELEPHONE ENCOUNTER
Reason for Call:  Other appointment    Detailed comments: Is being discharged for the hospital and they need to come in for a follow up. His daughter would like to come in the morning. Please call her back to schedule appointment.     Phone Number Patient can be reached at: Other phone number:  [phone]    Best Time: Anytime     Can we leave a detailed message on this number? YES    Call taken on 7/27/2017 at 2:33 PM by Nhi Byrd

## 2017-07-27 NOTE — TELEPHONE ENCOUNTER
Was in hospital discharged a week ago last Sunday, was in transitional care until tomorrow - needs appointment with PCP.  Appointment scheduled for August 4 at 11:00 am.    Zoie Vazquez RN  [facility]

## 2017-07-31 ENCOUNTER — CARE COORDINATION (OUTPATIENT)
Dept: CARE COORDINATION | Facility: CLINIC | Age: 82
End: 2017-07-31

## 2017-07-31 NOTE — PROGRESS NOTES
Clinic Care Coordination Contact  [facility]    Referral Source: SNF/TCU Hand-Off (non-IP Report)  Clinical Data: Care Coordinator Outreach  Outreach attempted x 1.  Left message on voicemail with call back information and requested return call.  Plan: Care Coordinator will try to reach patient again in 1-2 business days.      Liz Booker RN  Clinic Care Coordinator  [facility]  [phone]

## 2017-08-04 ENCOUNTER — OFFICE VISIT (OUTPATIENT)
Dept: FAMILY MEDICINE | Facility: CLINIC | Age: 82
End: 2017-08-04
Payer: COMMERCIAL

## 2017-08-04 VITALS
TEMPERATURE: 97.6 F | WEIGHT: 135 LBS | OXYGEN SATURATION: 98 % | BODY MASS INDEX: 24.3 KG/M2 | SYSTOLIC BLOOD PRESSURE: 121 MMHG | DIASTOLIC BLOOD PRESSURE: 55 MMHG | HEART RATE: 60 BPM

## 2017-08-04 DIAGNOSIS — I50.22: Primary | ICD-10-CM

## 2017-08-04 DIAGNOSIS — I73.9 PAD (PERIPHERAL ARTERY DISEASE) (H): ICD-10-CM

## 2017-08-04 LAB
ANION GAP SERPL CALCULATED.3IONS-SCNC: 7 MMOL/L (ref 3–14)
BASOPHILS # BLD AUTO: 0 10E9/L (ref 0–0.2)
BASOPHILS NFR BLD AUTO: 0.5 %
BUN SERPL-MCNC: 26 MG/DL (ref 7–30)
CALCIUM SERPL-MCNC: 9.1 MG/DL (ref 8.5–10.1)
CHLORIDE SERPL-SCNC: 105 MMOL/L (ref 94–109)
CO2 SERPL-SCNC: 29 MMOL/L (ref 20–32)
CREAT SERPL-MCNC: 0.95 MG/DL (ref 0.66–1.25)
DIFFERENTIAL METHOD BLD: ABNORMAL
EOSINOPHIL # BLD AUTO: 0.1 10E9/L (ref 0–0.7)
EOSINOPHIL NFR BLD AUTO: 1.3 %
ERYTHROCYTE [DISTWIDTH] IN BLOOD BY AUTOMATED COUNT: 18 % (ref 10–15)
GFR SERPL CREATININE-BSD FRML MDRD: 75 ML/MIN/1.7M2
GLUCOSE SERPL-MCNC: 105 MG/DL (ref 70–99)
HCT VFR BLD AUTO: 32.4 % (ref 40–53)
HGB BLD-MCNC: 9.8 G/DL (ref 13.3–17.7)
LYMPHOCYTES # BLD AUTO: 2.1 10E9/L (ref 0.8–5.3)
LYMPHOCYTES NFR BLD AUTO: 24.8 %
MCH RBC QN AUTO: 22.8 PG (ref 26.5–33)
MCHC RBC AUTO-ENTMCNC: 30.2 G/DL (ref 31.5–36.5)
MCV RBC AUTO: 76 FL (ref 78–100)
MONOCYTES # BLD AUTO: 1 10E9/L (ref 0–1.3)
MONOCYTES NFR BLD AUTO: 11.5 %
NEUTROPHILS # BLD AUTO: 5.3 10E9/L (ref 1.6–8.3)
NEUTROPHILS NFR BLD AUTO: 61.9 %
PLATELET # BLD AUTO: 191 10E9/L (ref 150–450)
POTASSIUM SERPL-SCNC: 3.8 MMOL/L (ref 3.4–5.3)
RBC # BLD AUTO: 4.29 10E12/L (ref 4.4–5.9)
SODIUM SERPL-SCNC: 141 MMOL/L (ref 133–144)
WBC # BLD AUTO: 8.6 10E9/L (ref 4–11)

## 2017-08-04 PROCEDURE — 99214 OFFICE O/P EST MOD 30 MIN: CPT | Performed by: INTERNAL MEDICINE

## 2017-08-04 PROCEDURE — 80048 BASIC METABOLIC PNL TOTAL CA: CPT | Performed by: INTERNAL MEDICINE

## 2017-08-04 PROCEDURE — 85025 COMPLETE CBC W/AUTO DIFF WBC: CPT | Performed by: INTERNAL MEDICINE

## 2017-08-04 PROCEDURE — 36415 COLL VENOUS BLD VENIPUNCTURE: CPT | Performed by: INTERNAL MEDICINE

## 2017-08-04 RX ORDER — ASPIRIN 81 MG/1
81 TABLET, CHEWABLE ORAL
COMMUNITY
Start: 2017-07-15

## 2017-08-04 RX ORDER — BUMETANIDE 1 MG/1
2 TABLET ORAL
COMMUNITY
Start: 2017-07-29 | End: 2017-10-23

## 2017-08-04 NOTE — LETTER
[facility]  [address] [place]  [address]  [phone]        August 8, 2017    Justin Doan  [address] [place] [address]        Dear Justin,    Labs show a reduction in the hemoglobin     Ok to take iron 325 mg by mouth with breakfast for 6 weeks then return for a re-check     Results for orders placed or performed in visit on 08/04/17   Basic metabolic panel   Result Value Ref Range    Sodium 141 133 - 144 mmol/L    Potassium 3.8 3.4 - 5.3 mmol/L    Chloride 105 94 - 109 mmol/L    Carbon Dioxide 29 20 - 32 mmol/L    Anion Gap 7 3 - 14 mmol/L    Glucose 105 (H) 70 - 99 mg/dL    Urea Nitrogen 26 7 - 30 mg/dL    Creatinine 0.95 0.66 - 1.25 mg/dL    GFR Estimate 75 >60 mL/min/1.7m2    GFR Estimate If Black >90   GFR Calc   >60 mL/min/1.7m2    Calcium 9.1 8.5 - 10.1 mg/dL   CBC with platelets and differential   Result Value Ref Range    WBC 8.6 4.0 - 11.0 10e9/L    RBC Count 4.29 (L) 4.4 - 5.9 10e12/L    Hemoglobin 9.8 (L) 13.3 - 17.7 g/dL    Hematocrit 32.4 (L) 40.0 - 53.0 %    MCV 76 (L) 78 - 100 fl    MCH 22.8 (L) 26.5 - 33.0 pg    MCHC 30.2 (L) 31.5 - 36.5 g/dL    RDW 18.0 (H) 10.0 - 15.0 %    Platelet Count 191 150 - 450 10e9/L    Diff Method Automated Method     % Neutrophils 61.9 %    % Lymphocytes 24.8 %    % Monocytes 11.5 %    % Eosinophils 1.3 %    % Basophils 0.5 %    Absolute Neutrophil 5.3 1.6 - 8.3 10e9/L    Absolute Lymphocytes 2.1 0.8 - 5.3 10e9/L    Absolute Monocytes 1.0 0.0 - 1.3 10e9/L    Absolute Eosinophils 0.1 0.0 - 0.7 10e9/L    Absolute Basophils 0.0 0.0 - 0.2 10e9/L       If you have any questions please call the clinic at [phone].    Sincerely,    Arturo ANGL

## 2017-08-04 NOTE — MR AVS SNAPSHOT
"              After Visit Summary   8/4/2017    Justin Doan    MRN: [id number]           Patient Information     Date Of Birth          11/14/1932        Visit Information        Provider Department      8/4/2017 11:00 AM Arturo Ware MD [facility]        Today's Diagnoses     CHF NYHA class III (symptoms with mildly strenuous activities), chronic, systolic (H)    -  1    PAD (peripheral artery disease) (H)          Care Instructions    About [facility]  [address] [place] [address], [place] [address]  Phone: Click to Show Phone  Fax: [phone]                Follow-ups after your visit        Your next 10 appointments already scheduled     Oct 13, 2017  9:30 AM CDT   New Visit with Jonas Leavitt MD   [facility] ([address]  [place] [address] [phone]              Who to contact     If you have questions or need follow up information about today's clinic visit or your schedule please contact [facility] directly at [phone].  Normal or non-critical lab and imaging results will be communicated to you by MyChart, letter or phone within 4 business days after the clinic has received the results. If you do not hear from us within 7 days, please contact the clinic through POPAPPhart or phone. If you have a critical or abnormal lab result, we will notify you by phone as soon as possible.  Submit refill requests through Thotz or call your pharmacy and they will forward the refill request to us. Please allow 3 business days for your refill to be completed.          Additional Information About Your Visit        POPAPPhart Information     Thotz lets you send messages to your doctor, view your test results, renew your prescriptions, schedule appointments and more. To sign up, go to [email or web address] . Click on \"Log in\" on the left side of the screen, which will take you to the Welcome " "page. Then click on \"Sign up Now\" on the right side of the page.     You will be asked to enter the access code listed below, as well as some personal information. Please follow the directions to create your username and password.     Your access code is: [email or web address]  Expires: 2017  9:41 AM     Your access code will  in 90 days. If you need help or a new code, please call your [facility] clinic or [phone].        Care EveryWhere ID     This is your Care EveryWhere ID. This could be used by other organizations to access your [facility] medical records  [id number]        Your Vitals Were     Pulse Temperature Pulse Oximetry BMI (Body Mass Index)          60 97.6  F (36.4  C) (Oral) 98% 24.3 kg/m2         Blood Pressure from Last 3 Encounters:   17 121/55   05/15/17 133/62   17 137/65    Weight from Last 3 Encounters:   17 135 lb (61.2 kg)   05/15/17 135 lb (61.2 kg)   17 135 lb (61.2 kg)              We Performed the Following     Basic metabolic panel     CBC with platelets and differential        Primary Care Provider Office Phone # Fax #    Arturo Ware [phone]       [facility] [address] [place], [place]. \Hospital Has a New Name and Outlook.\"" [id number]        Equal Access to Services     LUNA RICCI : Hadii aad ku hadasho Soomaali, waaxda luqadaha, qaybta kaalmada adeegyada, hayes belcher. So [facility] [phone].    ATENCIÓN: Si habla español, tiene a garrison disposición servicios gratuitos de asistencia lingüística. Llame al [phone].    We comply with applicable federal civil rights laws and [place] laws. We do not discriminate on the basis of race, color, national origin, age, disability sex, sexual orientation or gender identity.            Thank you!     Thank you for choosing [facility]  for your care. Our goal is always to provide you with excellent care. Hearing back from our patients is one way we can " continue to improve our services. Please take a few minutes to complete the written survey that you may receive in the mail after your visit with us. Thank you!             Your Updated Medication List - Protect others around you: Learn how to safely use, store and throw away your medicines at www.disposemymeds.org.          This list is accurate as of: 8/4/17 11:48 AM.  Always use your most recent med list.                   Brand Name Dispense Instructions for use Diagnosis    aspirin 81 MG chewable tablet      Take 81 mg by mouth    CHF NYHA class III (symptoms with mildly strenuous activities), chronic, systolic (H), PAD (peripheral artery disease) (H)       bumetanide 1 MG tablet    BUMEX     Take 2 mg by mouth    CHF NYHA class III (symptoms with mildly strenuous activities), chronic, systolic (H), PAD (peripheral artery disease) (H)       capsaicin 0.075 % cream    ZOSTRIX    120 g    Apply topically 3 times daily    Needs smoking cessation education, Ankle arthritis       cholecalciferol 1000 UNIT tablet    vitamin D    90    1 TABLET DAILY    Hypovitaminosis D       clopidogrel 75 MG tablet    PLAVIX    90 tablet    Take 1 tablet (75 mg) by mouth daily To reduce risk of stroke    CVD (cerebrovascular disease)       donepezil 5 MG tablet    ARIcept    90 tablet    TAKE ONE TABLET BY MOUTH AT BEDTIME    Short-term memory loss       ezetimibe 10 MG tablet    ZETIA    90 tablet    Take 1 tablet (10 mg) by mouth daily For cholesterol control    Hyperlipidemia LDL goal <70       metoprolol 100 MG 24 hr tablet    TOPROL-XL    90 tablet    TAKE 1 TABLET (100 MG) BY MOUTH DAILY FOR HEART AND BLOOD PRESSURE    LVH (left ventricular hypertrophy)       omeprazole 20 MG CR capsule    priLOSEC    90 capsule    TAKE 1 CAPSULE (20 MG) BY MOUTH DAILY    Gastroesophageal reflux disease without esophagitis       REFRESH 1 % ophthalmic solution   Generic drug:  carboxymethylcellulose      1 drop 3 times daily.         simvastatin 40 MG tablet    ZOCOR    90 tablet    Take 1 tablet (40 mg) by mouth At Bedtime .  Due for cholesterol check.    Hyperlipidemia LDL goal <70

## 2017-08-04 NOTE — PATIENT INSTRUCTIONS
[facility]  [address] [place] [address]  Phone: Click to Show Phone  Fax: [phone]

## 2017-08-04 NOTE — PROGRESS NOTES
SUBJECTIVE:                                                    Justin Doan is a 84 year old male who presents to clinic today for the following health issues:  [facility]    Nurse on Mondays   Pt and OT Monday and Tuesday and bathing     Home since Monday    Breathing stable  Weight   Meals on wheels    Patient has stopped driving and is very upset with this decision  However, memory has reached a critical point of deficit.    Still smoking heavily    Hospital Follow-up Visit:    Hospital/Nursing Home/IP Rehab Facility: [facility]  Date of Admission: 7/25/17  Date of Discharge: 7/30/17  Reason(s) for Admission: Swollen legs            Problems taking medications regularly:  None       Medication changes since discharge: aspirin and bumetanide was given       Problems adhering to non-medication therapy:  None    Summary of hospitalization:  [facility] discharge summary reviewed  Diagnostic Tests/Treatments reviewed.  Follow up needed: none  Other Healthcare Providers Involved in Patient s Care:         patient was in rehab and at home.  refusing some home cares at this [facility]  Update since discharge: stable.     Post Discharge Medication Reconciliation: discharge medications reconciled and changed, per note/orders (see AVS).  Plan of care communicated with patient     Coding guidelines for this visit:  Type of Medical   Decision Making Face-to-Face Visit       within 7 Days of discharge Face-to-Face Visit        within 14 days of discharge   Moderate Complexity [id number]   High Complexity 88390 81824                  Problem list and histories reviewed & adjusted, as indicated.  Additional history: as documented    Patient Active Problem List   Diagnosis     COPD (chronic obstructive pulmonary disease) (H)     Renal artery stenosis (H)     PAD (peripheral artery disease) (H)     GERD (gastroesophageal reflux disease)     Insulin resistance     Tobacco abuse     Advanced directives,  counseling/discussion     Hyperlipidemia LDL goal <70     CVD (cerebrovascular disease)     Health Care Home     Thrombocytopenia (H)     S/P carotid endarterectomy     Pseudophakia, ou     ITP (idiopathic thrombocytopenic purpura)     Abdominal aortic aneurysm (H)     Occlusion of superior mesenteric artery (H)     HTN, goal below 150/90     Short-term memory loss     Posterior vitreous detachment, bilateral     Conjunctival cyst, left     Past Surgical History:   Procedure Laterality Date     CATARACT IOL, RT/LT       ENDARTERECTOMY CAROTID  12/21/2012    Procedure: ENDARTERECTOMY CAROTID;  Left Carotid  Endarterectomy ;  Surgeon: Gio Villatoro MD;  Location:  OR     ENT SURGERY      nose surgery to open things up     PHACOEMULSIFICATION WITH STANDARD INTRAOCULAR LENS IMPLANT  10/2013    right eye; left eye     REPLACE VALVE AORTIC MINIMALLY INVASIVE  2/14/2012    Procedure:REPLACE VALVE AORTIC MINIMALLY INVASIVE; Minimally Invasive Aortic Valve Replacement on pump oxygenator , Trans-Esophageal Cardiac Echogram performed by ; Surgeon:EDEL SALAZAR; Location: OR  --- used porcine      VASCULAR SURGERY  03/12    stent placed in superior mesenteric artery for acute ischemia       Social History   Substance Use Topics     Smoking status: Current Every Day Smoker     Packs/day: 0.50     Years: 60.00     Types: Cigarettes     Smokeless tobacco: Never Used      Comment: 7 cigs a day     Alcohol use No      Comment: rarely; consumed heavy use prior     Family History   Problem Relation Age of Onset     HEART DISEASE Father      mi age 72     HEART DISEASE Brother      MI age 44         Current Outpatient Prescriptions   Medication Sig Dispense Refill     aspirin 81 MG chewable tablet Take 81 mg by mouth       bumetanide (BUMEX) 1 MG tablet Take 2 mg by mouth       donepezil (ARICEPT) 5 MG tablet TAKE ONE TABLET BY MOUTH AT BEDTIME 90 tablet 1     ezetimibe (ZETIA) 10 MG tablet Take 1 tablet (10  mg) by mouth daily For cholesterol control 90 tablet 4     clopidogrel (PLAVIX) 75 MG tablet Take 1 tablet (75 mg) by mouth daily To reduce risk of stroke 90 tablet 3     simvastatin (ZOCOR) 40 MG tablet Take 1 tablet (40 mg) by mouth At Bedtime .  Due for cholesterol check. 90 tablet 4     omeprazole (PRILOSEC) 20 MG CR capsule TAKE 1 CAPSULE (20 MG) BY MOUTH DAILY 90 capsule 1     metoprolol (TOPROL-XL) 100 MG 24 hr tablet TAKE 1 TABLET (100 MG) BY MOUTH DAILY FOR HEART AND BLOOD PRESSURE 90 tablet 2     carboxymethylcellulose (REFRESH) 1 % ophthalmic solution 1 drop 3 times daily.       VITAMIN D 1000 UNIT OR TABS 1 TABLET DAILY 90 3     capsaicin (ZOSTRIX) 0.075 % cream Apply topically 3 times daily (Patient not taking: Reported on 8/4/2017) 120 g 12     No Known Allergies  Recent Labs   Lab Test  08/04/17   1155  02/13/17   0928  02/10/16   1313  06/04/15   1005  02/25/15   1104  10/24/14   1045   08/14/13   0739   11/14/12   1018  08/14/12   1004   01/26/12   1430   09/13/10   0956   A1C   --    --   5.9   --    --    --    --    --    --    --   5.9   --   5.8   < >   --    LDL   --   67  62   --   98  75   --   79   < >  71  75   --    --    < >   --    HDL   --   54   --    --    --    --    --   38*   --   37*   --    --    --    < >   --    TRIG   --   86   --    --    --    --    --   69   --   84   --    --    --    < >   --    ALT   --   20   --    --   19  17   --   21   --   17  7   < >   --    < >   --    CR  0.95  0.92  0.97  0.97  0.96  1.05   < >  1.05   < >  0.95  1.02   < >   --    < >   --    GFRESTIMATED  75  78  74  74  75  68   < >  68   < >  76  70   < >   --    < >   --    GFRESTBLACK  >90   GFR Calc    >90   GFR Calc    90  90  >90   GFR Calc    82   < >  82   < >  >90  85   < >   --    < >   --    POTASSIUM  3.8  4.3  4.3  4.4  4.6  5.1   < >  5.1   < >  4.2  5.0   < >   --    < >   --    TSH   --    --    --   0.77   --    --    --    --     --    --    --    --    --    --   0.47    < > = values in this interval not displayed.            Reviewed and updated as needed this visit by clinical staffTobacco  Allergies  Meds  Med Hx  Surg Hx  Fam Hx  Soc Hx      Reviewed and updated as needed this visit by Provider         ROS:  Constitutional, HEENT, cardiovascular, pulmonary, gi and gu systems are negative, except as otherwise noted.      OBJECTIVE:   /55 (BP Location: Left arm, Patient Position: Chair, Cuff Size: Adult Regular)  Pulse 60  Temp 97.6  F (36.4  C) (Oral)  Wt 135 lb (61.2 kg)  SpO2 98%  BMI 24.3 kg/m2  Body mass index is 24.3 kg/(m^2).  GENERAL: healthy, alert and no distress  NECK: no adenopathy, no asymmetry, masses, or scars and thyroid normal to palpation  RESP: lungs clear to auscultation - no rales, rhonchi or wheezes  CV: regular rate and rhythm, normal S1 S2, no S3 or S4, no murmur, click or rub, no peripheral edema and peripheral pulses strong  ABDOMEN: soft, nontender, no hepatosplenomegaly, no masses and bowel sounds normal  MS: no gross musculoskeletal defects noted, no edema    Diagnostic Test Results:  Results for orders placed or performed in visit on 08/04/17   Basic metabolic panel   Result Value Ref Range    Sodium 141 133 - 144 mmol/L    Potassium 3.8 3.4 - 5.3 mmol/L    Chloride 105 94 - 109 mmol/L    Carbon Dioxide 29 20 - 32 mmol/L    Anion Gap 7 3 - 14 mmol/L    Glucose 105 (H) 70 - 99 mg/dL    Urea Nitrogen 26 7 - 30 mg/dL    Creatinine 0.95 0.66 - 1.25 mg/dL    GFR Estimate 75 >60 mL/min/1.7m2    GFR Estimate If Black >90   GFR Calc   >60 mL/min/1.7m2    Calcium 9.1 8.5 - 10.1 mg/dL   CBC with platelets and differential   Result Value Ref Range    WBC 8.6 4.0 - 11.0 10e9/L    RBC Count 4.29 (L) 4.4 - 5.9 10e12/L    Hemoglobin 9.8 (L) 13.3 - 17.7 g/dL    Hematocrit 32.4 (L) 40.0 - 53.0 %    MCV 76 (L) 78 - 100 fl    MCH 22.8 (L) 26.5 - 33.0 pg    MCHC 30.2 (L) 31.5 - 36.5 g/dL    RDW  18.0 (H) 10.0 - 15.0 %    Platelet Count 191 150 - 450 10e9/L    Diff Method Automated Method     % Neutrophils 61.9 %    % Lymphocytes 24.8 %    % Monocytes 11.5 %    % Eosinophils 1.3 %    % Basophils 0.5 %    Absolute Neutrophil 5.3 1.6 - 8.3 10e9/L    Absolute Lymphocytes 2.1 0.8 - 5.3 10e9/L    Absolute Monocytes 1.0 0.0 - 1.3 10e9/L    Absolute Eosinophils 0.1 0.0 - 0.7 10e9/L    Absolute Basophils 0.0 0.0 - 0.2 10e9/L       ASSESSMENT/PLAN:       ICD-10-CM    1. CHF NYHA class III (symptoms with mildly strenuous activities), chronic, systolic (H) I50.22 aspirin 81 MG chewable tablet     bumetanide (BUMEX) 1 MG tablet     Basic metabolic panel     CBC with platelets and differential   2. PAD (peripheral artery disease) (H) I73.9 aspirin 81 MG chewable tablet     bumetanide (BUMEX) 1 MG tablet     Basic metabolic panel     CBC with platelets and differential     3. Short term memory worsening significantly  Patient with worsening function    Agree with no driving for safety            Arturo Ware MD  [facility]

## 2017-08-04 NOTE — NURSING NOTE
"Chief Complaint   Patient presents with     Hospital F/U     swollen legs 7/25/17 - 7/30/17       Initial /55 (BP Location: Left arm, Patient Position: Chair, Cuff Size: Adult Regular)  Pulse 60  Temp 97.6  F (36.4  C) (Oral)  Wt 135 lb (61.2 kg)  SpO2 98%  BMI 24.3 kg/m2 Estimated body mass index is 24.3 kg/(m^2) as calculated from the following:    Height as of 2/13/17: 5' 2.5\" (1.588 m).    Weight as of this encounter: 135 lb (61.2 kg).  Medication Reconciliation: complete   Nelida See BRIANDA Bautista      "

## 2017-08-14 ENCOUNTER — TELEPHONE (OUTPATIENT)
Dept: FAMILY MEDICINE | Facility: CLINIC | Age: 82
End: 2017-08-14

## 2017-08-14 DIAGNOSIS — J96.01 ACUTE RESPIRATORY FAILURE WITH HYPOXIA (H): Primary | ICD-10-CM

## 2017-08-14 RX ORDER — NITROGLYCERIN 0.1MG/HR
1 PATCH, TRANSDERMAL 24 HOURS TRANSDERMAL DAILY
Qty: 30 PATCH | Status: CANCELLED | OUTPATIENT
Start: 2017-08-14

## 2017-08-14 NOTE — TELEPHONE ENCOUNTER
Called [facility] spoke to Lilly Tamez - Nitroglycerin patch [facility] - will add to medication list as historical after Lilly returns call with .           Zoie Vazquez RN  [facility]

## 2017-08-14 NOTE — TELEPHONE ENCOUNTER
Forms received from: [facility]   Phone number listed: [phone]   Fax listed: [phone]  Date received: 8-14-17  Form description: [facility]  Once forms are completed, please return to Camelia via fax.  Is patient requesting to be contacted when forms are completed: n/a  Form placed: RN folder  Heydi Emmanuel    Form given to RN to review med list.  Order pended for provider to sign.

## 2017-08-15 RX ORDER — NITROGLYCERIN 40 MG/1
1 PATCH TRANSDERMAL DAILY
COMMUNITY
Start: 2017-08-15 | End: 2017-11-29

## 2017-08-15 NOTE — TELEPHONE ENCOUNTER
Medications reconciled on [facility] form, changes noted on form.  Form to PCP for signature.    Zoie Vazquez RN  [facility]

## 2017-08-15 NOTE — TELEPHONE ENCOUNTER
Attempt # 1  Called patient at home number.  Was call answered?  No, left message to call nurse line.    Zoie Vazquez RN  [facility]

## 2017-08-15 NOTE — TELEPHONE ENCOUNTER
Lilly called back, states the NTG patch is 0.2 mg/hour patch, 1 patch daily to skin.  I added this to Epic med list, flagged for team RN to correct on HHC and route to PCP to sign HHC order.    Lilly also states that the nurse who did home care admission has concerns about Justin's ability to take care of himself and if he is taking meds.   Lilly and supervisor will be visiting Justin in 2 days to assess again and may be filing vulnerable adult report.   Apparently when they brought up concerns with family they were told by family that this is patient's baseline, has been like this for 4-5 years.  He lives alone.   Was last seen by PCP 8/4/17, memory problems identified.    Lilly states she will update us after visit on Thursday.    Chana Aguilar, LISS  [facility]

## 2017-08-16 PROCEDURE — G0180 MD CERTIFICATION HHA PATIENT: HCPCS | Performed by: INTERNAL MEDICINE

## 2017-08-22 ENCOUNTER — TELEPHONE (OUTPATIENT)
Dept: FAMILY MEDICINE | Facility: CLINIC | Age: 82
End: 2017-08-22

## 2017-08-22 NOTE — TELEPHONE ENCOUNTER
Forms received from: [facility]   Phone number listed: [phone]   Fax listed: [phone]  Date received: 8-22-17  Form description: vitals  Once forms are completed, please return to Camelia via fax.  Is patient requesting to be contacted when forms are completed: n/a  Form placed: provider desk  Heydi Emmanuel

## 2017-08-23 ENCOUNTER — TELEPHONE (OUTPATIENT)
Dept: FAMILY MEDICINE | Facility: CLINIC | Age: 82
End: 2017-08-23

## 2017-08-23 NOTE — LETTER
[address] [address] [phone]    Justin Doan     Due to medical concerns including dementia, patient cannot drive.  It is unsafe for him and all on the roads.              GUILLERMO RESENDIZ MD        August 23, 2017

## 2017-08-23 NOTE — TELEPHONE ENCOUNTER
Reason for Call:  Other Letter    Detailed comments: Daughter calling, concerned over patient and his driving.  Patient was told that he should not be driving, but patient has become obsessed with driving.  Family took away the keys, but he managed to have a lock smith come out an make another set of keys.  Family has since taken the car.  Nini states that patient is calling her 20+ times per day, asking about the car.  Patient does have dementia.  Nini asking if PCP can write patient a letter with detailed plan about him not being able to drive.    Routing to provider to advise.    Phone Number Patient can be reached at: Other phone number:  [phone]    Best Time: any    Can we leave a detailed message on this number? YES    Call taken on 8/23/2017 at 10:54 AM by Heydi Emmanuel

## 2017-08-24 ENCOUNTER — CARE COORDINATION (OUTPATIENT)
Dept: CARE COORDINATION | Facility: CLINIC | Age: 82
End: 2017-08-24

## 2017-08-24 NOTE — PROGRESS NOTES
Clinic Care Coordination Contact  Care Coordination Communication    Referral Source: SNF/TCU Hand-Off (non-IP Report)    Clinical Data: Patient was hospitalized at [facility] from 7/25/17 to 7/30/17 with diagnosis of Swollen Legs.     Home Care Contact:              Home Care Agency: [facility]              Contact name () and phone number: Lilly [phone]              Care Coordination contacted home care: Yes              Anticipated start of care date: 8/14/17    Patient Contact:               Introduced self and role of care coordination. N/A              Discharge instructions were reviewed with patient/caregiver.               Do you have any questions about your medications? N/A              Follow up appointment: 8/4/17              Provided 24 Hour Nurse Line and/or 24 Hour Appointment Scheduling: N/A              Home care has contacted patient: Yes                 Plan: RN/SW Care Coordinator will await notification from home care staff informing RN/SW Care Coordinator of patients discharge plans/needs. RN/SW Care Coordinator will review chart and outreach to home care every 4 weeks and as needed.  Left voice mail message for  Nurse  leonora Carr self and SW CCStevan Booker RN  Clinic Care Coordinator  [facility]  [phone]

## 2017-08-24 NOTE — PROGRESS NOTES
Clinic Care Coordination Contact  Care Coordination Communication           Home Care Contact:              Home Care Agency: [facility]              Contact name () and phone number: whitney, [phone], Fax: [phone]              Care Coordination contacted home care: Yes              Anticipated start of care date: already started        Plan: RN/SW Care Coordinator will await notification from home care staff informing RN/SW Care Coordinator of patients discharge plans/needs. RN/SW Care Coordinator will review chart and outreach to home care every 4 weeks and as needed.      SASCHA Mendieta  Care Navigator  [facility]  [phone]

## 2017-08-30 ENCOUNTER — TELEPHONE (OUTPATIENT)
Dept: FAMILY MEDICINE | Facility: CLINIC | Age: 82
End: 2017-08-30

## 2017-08-30 NOTE — TELEPHONE ENCOUNTER
Forms received from: [facility]   Phone number listed: [phone]   Fax listed: [phone]  Date received: 8-30-17  Form description: careplan  Once forms are completed, please return to Camelia via fax.  Is patient requesting to be contacted when forms are completed: n/a  Form placed: provider desk  Heydi Emmanuel

## 2017-09-01 ENCOUNTER — TELEPHONE (OUTPATIENT)
Dept: FAMILY MEDICINE | Facility: CLINIC | Age: 82
End: 2017-09-01

## 2017-09-01 NOTE — TELEPHONE ENCOUNTER
Forms received from: [facility]   Phone number listed: [phone]   Fax listed: [phone]  Date received: 09/01/2017  Form description: physician verbal discharge order from occupational therapy services  Once forms are completed, please return to [facility] via fax.  Form placed: in providers folder  Radha Dozier

## 2017-09-14 ENCOUNTER — TELEPHONE (OUTPATIENT)
Dept: FAMILY MEDICINE | Facility: CLINIC | Age: 82
End: 2017-09-14

## 2017-09-14 NOTE — TELEPHONE ENCOUNTER
Forms received from: [facility]   Phone number listed: [phone]   Fax listed: [phone]  Date received: 9-14-17  Form description: verbal DC order  Once forms are completed, please return to Camelia via fax.  Is patient requesting to be contacted when forms are completed: n/a  Form placed: provider desk  Heydi Emmanuel

## 2017-09-19 ENCOUNTER — TELEPHONE (OUTPATIENT)
Dept: FAMILY MEDICINE | Facility: CLINIC | Age: 82
End: 2017-09-19

## 2017-09-19 NOTE — TELEPHONE ENCOUNTER
Forms received from: [facility]   Phone number listed: [phone]   Fax listed: [phone]  Date received: 09/19/2017  Form description: revision to plan of care  Once forms are completed, please return to [facility] via fax.  Form placed: in providers folder  Radha Dozier

## 2017-09-20 ENCOUNTER — MEDICAL CORRESPONDENCE (OUTPATIENT)
Dept: HEALTH INFORMATION MANAGEMENT | Facility: CLINIC | Age: 82
End: 2017-09-20

## 2017-09-22 ENCOUNTER — CARE COORDINATION (OUTPATIENT)
Dept: CARE COORDINATION | Facility: CLINIC | Age: 82
End: 2017-09-22

## 2017-09-22 NOTE — PROGRESS NOTES
Clinic Care Coordination Contact  [facility]/Voicemail    Referral Source: Home Care Discharge  Clinical Data: Care Coordinator Outreach  Outreach attempted x 1.  Left message on voicemail with call back information and requested return call.  Plan:  Care Coordinator will try to reach patient again in 2-3 business days.    Liz Booker RN  Clinic Care Coordinator  [facility]  [phone]

## 2017-09-26 ENCOUNTER — CARE COORDINATION (OUTPATIENT)
Dept: CARE COORDINATION | Facility: CLINIC | Age: 82
End: 2017-09-26

## 2017-09-28 NOTE — PROGRESS NOTES
Clinic Care Coordination Contact  OUTREACH    Referral Information:  Referral Source: Home Care Discharge  Reason for Contact: Follow up after home care  Care Conference: No     Universal Utilization:   ED Visits in last year: 0     Last PCP appointment: 05/15/17  Missed Appointments:  (N/A)  Concerns:  (Not assessed)  Multiple Providers or Specialists:  (Not assessed)    Clinical Concerns:  Medical concerns:  Patient Active Problem List   Diagnosis     COPD (chronic obstructive pulmonary disease) (H)     Renal artery stenosis (H)     PAD (peripheral artery disease) (H)     GERD (gastroesophageal reflux disease)     Insulin resistance     Tobacco abuse     Advanced directives, counseling/discussion     Hyperlipidemia LDL goal <70     CVD (cerebrovascular disease)     Health Care Home     Thrombocytopenia (H)     S/P carotid endarterectomy     Pseudophakia, ou     ITP (idiopathic thrombocytopenic purpura)     Abdominal aortic aneurysm (H)     Occlusion of superior mesenteric artery (H)     HTN, goal below 150/90     Short-term memory loss     Posterior vitreous detachment, bilateral     Conjunctival cyst, left   Current Behavioral Concerns: Cognitive changes    Clinical Pathway Name: None      Medication Management: Family helps       Functional Status:        Transportation: Patient no longer drives. Family has taken keys away.            Psychosocial:  Current living arrangement:: I live alone. Patient likes to visit with his friends at [employer] where he used to work. He also enjoys playing cards with friends.     Financial/Insurance: [facility]        Advanced Care Plans/Directives on file:: No        Patient/Caregiver understanding: Daughter reports she was unaware patient was no longer receiving home care services. Daughter would like to speak with Nurse  Lilly from [facility] to discuss any concerns she may have about her father. Daughter asked RN KRISS to assist her in  contacting Nurse Carr.     Plan: Spoke with staff member from [facility]. Gave daughter's name and phone number to staff member.  Stated they would  give this information to NurseLilly and ask her to contact patient's daughter.            Liz Booker RN  Clinic Care Coordinator  [facility]  [phone]

## 2017-10-02 ENCOUNTER — TELEPHONE (OUTPATIENT)
Dept: FAMILY MEDICINE | Facility: CLINIC | Age: 82
End: 2017-10-02

## 2017-10-02 NOTE — TELEPHONE ENCOUNTER
Reason for Call:  Form, our goal is to have forms completed with 72 hours, however, some forms may require a visit or additional information.    Type of letter, form or note:  FMLA    Who is the form from?: Patient    Where did the form come from: Patient or family brought in       What clinic location was the form placed at?: [place] ()    Where the form was placed: 's Box    What number is listed as a contact on the form?: [phone]       Additional comments: [facility] paperwork for Patient's son. Please call florencio Tha when ready to     Call taken on 10/2/2017 at 4:32 PM by José Antonio Noonan

## 2017-10-03 NOTE — TELEPHONE ENCOUNTER
Notified patient's son Tha that forms are available for  at the [facility] .

## 2017-10-04 ENCOUNTER — TRANSFERRED RECORDS (OUTPATIENT)
Dept: HEALTH INFORMATION MANAGEMENT | Facility: CLINIC | Age: 82
End: 2017-10-04

## 2017-10-06 ENCOUNTER — CARE COORDINATION (OUTPATIENT)
Dept: CARE COORDINATION | Facility: CLINIC | Age: 82
End: 2017-10-06

## 2017-10-06 NOTE — PROGRESS NOTES
Clinic Care Coordination Contact  Care Coordination Transition Communication    Referral Source: IP/TCU Report      Transition to Facility:              Facility Name: [facility]               Contact name and phone number/fax: [phone]    Plan: RN/SW Care Coordinator will await notification from facility staff informing RN/SW Care Coordinator of patient's discharge plans/needs. RN/SW Care Coordinator will review chart and outreach to facility staff every 4 weeks and as needed.     SASCHA Mendieta  Care Navigator  [facility]  [phone]

## 2017-10-17 ENCOUNTER — TELEPHONE (OUTPATIENT)
Dept: FAMILY MEDICINE | Facility: CLINIC | Age: 82
End: 2017-10-17

## 2017-10-17 NOTE — TELEPHONE ENCOUNTER
Reason for Call:  Other call back    Detailed comments: Vivienne from Home Health Care called in. She wants to make sure  is okay with following home care orders via phone and fax    Phone Number Patient can be reached at: Vivienne:[phone]    Best Time:      Can we leave a detailed message on this number? YES    Call taken on 10/17/2017 at 4:50 PM by Krystal Mullins

## 2017-10-18 ENCOUNTER — TELEPHONE (OUTPATIENT)
Dept: FAMILY MEDICINE | Facility: CLINIC | Age: 82
End: 2017-10-18

## 2017-10-18 DIAGNOSIS — I51.7 LVH (LEFT VENTRICULAR HYPERTROPHY): ICD-10-CM

## 2017-10-18 DIAGNOSIS — R41.3 SHORT-TERM MEMORY LOSS: ICD-10-CM

## 2017-10-18 DIAGNOSIS — E78.5 HYPERLIPIDEMIA LDL GOAL <70: ICD-10-CM

## 2017-10-18 DIAGNOSIS — I50.22: ICD-10-CM

## 2017-10-18 DIAGNOSIS — K21.9 GASTROESOPHAGEAL REFLUX DISEASE WITHOUT ESOPHAGITIS: ICD-10-CM

## 2017-10-18 DIAGNOSIS — I73.9 PAD (PERIPHERAL ARTERY DISEASE) (H): ICD-10-CM

## 2017-10-18 RX ORDER — BUMETANIDE 1 MG/1
2 TABLET ORAL
Qty: 30 TABLET | Status: CANCELLED | OUTPATIENT
Start: 2017-10-18

## 2017-10-18 RX ORDER — METOPROLOL SUCCINATE 100 MG/1
TABLET, EXTENDED RELEASE ORAL
Qty: 90 TABLET | Refills: 2 | Status: CANCELLED | OUTPATIENT
Start: 2017-10-18

## 2017-10-18 RX ORDER — EZETIMIBE 10 MG/1
10 TABLET ORAL DAILY
Qty: 90 TABLET | Refills: 4 | Status: CANCELLED | OUTPATIENT
Start: 2017-10-18

## 2017-10-18 RX ORDER — DONEPEZIL HYDROCHLORIDE 5 MG/1
TABLET, FILM COATED ORAL
Qty: 90 TABLET | Refills: 1 | Status: CANCELLED | OUTPATIENT
Start: 2017-10-18

## 2017-10-18 NOTE — TELEPHONE ENCOUNTER
Reason for Call:  Medication or medication refill:    Do you use a [facility]?  Name of the pharmacy and phone number for the current request:  unable to hear person on phone, told to call back with updated pharmacy    Name of the medication requested: bumetanide (BUMEX) 1 MG tablet, omeprazole (PRILOSEC) 20 MG CR capsule, ezetimibe (ZETIA) 10 MG tablet, depakote 250 mg twice a day    Other request: [facility] calling, did not get the name of the person I was talking to, his phone was cutting out, told him to call back, unable to hear name of pharmacy.    Can we leave a detailed message on this number? Not Applicable    Phone number patient can be reached at: Other phone number:  unknown    Best Time:     Call taken on 10/18/2017 at 12:44 PM by Yessi Webb

## 2017-10-18 NOTE — TELEPHONE ENCOUNTER
[facility] calling back, [facility] pharmacy pended, also requesting additional medication.  Patient is scheduled with PCP tomorrow, but didn't want to miss medication requests.    bumetanide (BUMEX) 1 MG tablet      Last Written Prescription Date: patient recorded 7-29-17  Last Fill Quantity: n/a, # refills: n/a  Last Office Visit with [facility], [facility] or [facility] prescribing provider: 8-4-17  Next 5 appointments (look out 90 days)     Oct 19, 2017  2:00 PM CDT   [facility]   [facility]    [address] [place] [id number]   [phone]                   Potassium   Date Value Ref Range Status   08/04/2017 3.8 3.4 - 5.3 mmol/L Final     Creatinine   Date Value Ref Range Status   08/04/2017 0.95 0.66 - 1.25 mg/dL Final     BP Readings from Last 3 Encounters:   08/04/17 121/55   05/15/17 133/62   02/13/17 137/65     donepezil (ARICEPT) 5 MG tablet      Last Written Prescription Date: 5-15-17  Last Fill Quantity: 90,  # refills: 1   Last Office Visit with [facility], [facility] or [facility] prescribing provider: 8-4-17                                         Next 5 appointments (look out 90 days)     Oct 19, 2017  2:00 PM CDT   [facility]   [facility]    [address] [facility] [address]   [phone]                  ezetimibe (ZETIA) 10 MG tablet       Last Written Prescription Date: 5-15-17  Last Fill Quantity: 90, # refills: 4    [facility] prescribing provider:  8-4-17   Future Office Visit:    Next 5 appointments (look out 90 days)     Oct 19, 2017  2:00 PM CDT   SHORT [facility]uro Ware MD   [facility]    [address] [facility] [address]   [phone]                  Cholesterol   Date Value Ref Range Status    02/13/2017 138 <200 mg/dL Final     HDL Cholesterol   Date Value Ref Range Status   02/13/2017 54 >39 mg/dL Final     LDL Cholesterol Calculated   Date Value Ref Range Status   02/13/2017 67 <100 mg/dL Final     Comment:     Desirable:       <100 mg/dl     Triglycerides   Date Value Ref Range Status   02/13/2017 86 <150 mg/dL Final     Comment:     Fasting specimen     Cholesterol/HDL Ratio   Date Value Ref Range Status   08/14/2013 3.4 0.0 - 5.0 Final     ALT   Date Value Ref Range Status   02/13/2017 20 0 - 70 U/L Final      metoprolol (TOPROL-XL) 100 MG 24 hr tablet      Last Written Prescription Date: 5-11-17  Last Fill Quantity: 90, # refills: 2    [facility] or [facility] prescribing provider:  8-4-17   Future Office Visit:    Next 5 appointments (look out 90 days)     Oct 19, 2017  2:00 PM CDT   SHORT with [facility]uro [facility] MD   [facility]    [address] [address]   [phone]                    BP Readings from Last 3 Encounters:   08/04/17 121/55   05/15/17 133/62   02/13/17 137/65     omeprazole (PRILOSEC) 20 MG CR capsule      Last Written Prescription Date: 5-11-17  Last Fill Quantity: 90,  # refills: 1   Last Office Visit with [facility], [facility] or [facility] prescribing provider: 8-4-17                                         Next 5 appointments (look out 90 days)     Oct 19, 2017  2:00 PM CDT   SHORT [facility] MD   [facility]    [address] [address]   [phone]

## 2017-10-18 NOTE — TELEPHONE ENCOUNTER
Called patient's HC [phone] to ask if patient was out of any medications or if patient can wait to see provider tomorrow for refills. He says that patient has enough medications. Patient was also released from hospital so there have been changes (we don't have patient on depakote, RN says patient is on it). Rn recommended patient to see provider first and update medication list. He can get his refills tomorrow. HC RN agreed and will have patient bring in updated medication list and discharge report.    Karla Marrero RN  [facility]

## 2017-10-18 NOTE — TELEPHONE ENCOUNTER
Called Vivienne from intake to notify her of message below from provider. Unable to reach but left a detailed message on voicemail notifying her of message below from provider. Also left RN triage line number in case of any questions.     Karla Marrero RN  [facility]

## 2017-10-19 ENCOUNTER — OFFICE VISIT (OUTPATIENT)
Dept: FAMILY MEDICINE | Facility: CLINIC | Age: 82
End: 2017-10-19
Payer: COMMERCIAL

## 2017-10-19 VITALS
SYSTOLIC BLOOD PRESSURE: 118 MMHG | TEMPERATURE: 97.3 F | HEART RATE: 54 BPM | DIASTOLIC BLOOD PRESSURE: 57 MMHG | OXYGEN SATURATION: 98 %

## 2017-10-19 DIAGNOSIS — F32.1 MODERATE MAJOR DEPRESSION (H): Primary | ICD-10-CM

## 2017-10-19 DIAGNOSIS — I51.7 LVH (LEFT VENTRICULAR HYPERTROPHY): ICD-10-CM

## 2017-10-19 DIAGNOSIS — E78.5 HYPERLIPIDEMIA LDL GOAL <70: ICD-10-CM

## 2017-10-19 DIAGNOSIS — Z23 NEED FOR PROPHYLACTIC VACCINATION AND INOCULATION AGAINST INFLUENZA: ICD-10-CM

## 2017-10-19 LAB
ALBUMIN SERPL-MCNC: 3.6 G/DL (ref 3.4–5)
ALP SERPL-CCNC: 79 U/L (ref 40–150)
ALT SERPL W P-5'-P-CCNC: 19 U/L (ref 0–70)
AST SERPL W P-5'-P-CCNC: 18 U/L (ref 0–45)
BASOPHILS # BLD AUTO: 0.1 10E9/L (ref 0–0.2)
BASOPHILS NFR BLD AUTO: 0.6 %
BILIRUB DIRECT SERPL-MCNC: <0.1 MG/DL (ref 0–0.2)
BILIRUB SERPL-MCNC: 0.3 MG/DL (ref 0.2–1.3)
DIFFERENTIAL METHOD BLD: ABNORMAL
EOSINOPHIL # BLD AUTO: 0.1 10E9/L (ref 0–0.7)
EOSINOPHIL NFR BLD AUTO: 1.5 %
ERYTHROCYTE [DISTWIDTH] IN BLOOD BY AUTOMATED COUNT: 21.9 % (ref 10–15)
HCT VFR BLD AUTO: 34.5 % (ref 40–53)
HGB BLD-MCNC: 10.3 G/DL (ref 13.3–17.7)
LYMPHOCYTES # BLD AUTO: 2.6 10E9/L (ref 0.8–5.3)
LYMPHOCYTES NFR BLD AUTO: 29.7 %
MCH RBC QN AUTO: 21.4 PG (ref 26.5–33)
MCHC RBC AUTO-ENTMCNC: 29.9 G/DL (ref 31.5–36.5)
MCV RBC AUTO: 72 FL (ref 78–100)
MONOCYTES # BLD AUTO: 1.2 10E9/L (ref 0–1.3)
MONOCYTES NFR BLD AUTO: 13.3 %
NEUTROPHILS # BLD AUTO: 4.9 10E9/L (ref 1.6–8.3)
NEUTROPHILS NFR BLD AUTO: 54.9 %
PLATELET # BLD AUTO: 163 10E9/L (ref 150–450)
PROT SERPL-MCNC: 7.4 G/DL (ref 6.8–8.8)
RBC # BLD AUTO: 4.81 10E12/L (ref 4.4–5.9)
VALPROATE SERPL-MCNC: 85 MG/L (ref 50–100)
WBC # BLD AUTO: 8.9 10E9/L (ref 4–11)

## 2017-10-19 PROCEDURE — 90662 IIV NO PRSV INCREASED AG IM: CPT | Performed by: INTERNAL MEDICINE

## 2017-10-19 PROCEDURE — 36415 COLL VENOUS BLD VENIPUNCTURE: CPT | Performed by: INTERNAL MEDICINE

## 2017-10-19 PROCEDURE — 85025 COMPLETE CBC W/AUTO DIFF WBC: CPT | Performed by: INTERNAL MEDICINE

## 2017-10-19 PROCEDURE — 80076 HEPATIC FUNCTION PANEL: CPT | Performed by: INTERNAL MEDICINE

## 2017-10-19 PROCEDURE — 80164 ASSAY DIPROPYLACETIC ACD TOT: CPT | Performed by: INTERNAL MEDICINE

## 2017-10-19 PROCEDURE — 99214 OFFICE O/P EST MOD 30 MIN: CPT | Mod: 25 | Performed by: INTERNAL MEDICINE

## 2017-10-19 PROCEDURE — G0008 ADMIN INFLUENZA VIRUS VAC: HCPCS | Performed by: INTERNAL MEDICINE

## 2017-10-19 RX ORDER — EZETIMIBE 10 MG/1
10 TABLET ORAL DAILY
Qty: 90 TABLET | Refills: 4 | Status: SHIPPED | OUTPATIENT
Start: 2017-10-19 | End: 2018-04-23

## 2017-10-19 RX ORDER — DIVALPROEX SODIUM 250 MG/1
250 TABLET, EXTENDED RELEASE ORAL 2 TIMES DAILY
Qty: 120 TABLET | Refills: 3 | Status: SHIPPED | OUTPATIENT
Start: 2017-10-19

## 2017-10-19 RX ORDER — METOPROLOL SUCCINATE 100 MG/1
TABLET, EXTENDED RELEASE ORAL
Qty: 90 TABLET | Refills: 3 | Status: SHIPPED | OUTPATIENT
Start: 2017-10-19

## 2017-10-19 RX ORDER — DIVALPROEX SODIUM 250 MG/1
250 TABLET, EXTENDED RELEASE ORAL 2 TIMES DAILY
COMMUNITY
End: 2017-10-19

## 2017-10-19 ASSESSMENT — PAIN SCALES - GENERAL: PAINLEVEL: NO PAIN (0)

## 2017-10-19 NOTE — NURSING NOTE
Prior to injection verified patient identity using patient's name and date of birth.  Yasmeen Chaudhary MA    Per orders of Dr. Ware, injection of Flu given by Yasmeen Chaudhary. Patient instructed to remain in clinic for 15 minutes afterwards, and to report any adverse reaction to me immediately.  Yasmeen Chaudhary MA    VIS for Flu given on same date of administration.  Staff signature/Title: Yasmeen Chaudhary MA

## 2017-10-19 NOTE — NURSING NOTE
"Chief Complaint   Patient presents with     Hospital F/U       Initial /57 (BP Location: Right arm, Patient Position: Chair, Cuff Size: Adult Regular)  Pulse 54  Temp 97.3  F (36.3  C) (Oral)  SpO2 98% Estimated body mass index is 24.3 kg/(m^2) as calculated from the following:    Height as of 2/13/17: 5' 2.5\" (1.588 m).    Weight as of 8/4/17: 135 lb (61.2 kg).  Medication Reconciliation: complete   Yasmeen Chaudhary MA      "

## 2017-10-19 NOTE — PROGRESS NOTES
SUBJECTIVE:   Justin Doan is a 84 year old male who presents to clinic today for the following health issues:    Restarting Home Care with Home Health   PT, OT, RN Visit    Hospital Follow: [facility]  Date 10/1/17-10/4/17  Reason: Edema           Problem list and histories reviewed & adjusted, as indicated.  Additional history: as documented    Patient Active Problem List   Diagnosis     COPD (chronic obstructive pulmonary disease) (H)     Renal artery stenosis (H)     PAD (peripheral artery disease) (H)     GERD (gastroesophageal reflux disease)     Insulin resistance     Tobacco abuse     Advanced directives, counseling/discussion     Hyperlipidemia LDL goal <70     CVD (cerebrovascular disease)     Health Care Home     Thrombocytopenia (H)     S/P carotid endarterectomy     Pseudophakia, ou     ITP (idiopathic thrombocytopenic purpura)     Abdominal aortic aneurysm (H)     Occlusion of superior mesenteric artery (H)     HTN, goal below 150/90     Short-term memory loss     Posterior vitreous detachment, bilateral     Conjunctival cyst, left     Past Surgical History:   Procedure Laterality Date     CATARACT IOL, RT/LT       ENDARTERECTOMY CAROTID  12/21/2012    Procedure: ENDARTERECTOMY CAROTID;  Left Carotid  Endarterectomy ;  Surgeon: Gio Villatoro MD;  Location:  OR     ENT SURGERY      nose surgery to open things up     PHACOEMULSIFICATION WITH STANDARD INTRAOCULAR LENS IMPLANT  10/2013    right eye; left eye     REPLACE VALVE AORTIC MINIMALLY INVASIVE  2/14/2012    Procedure:REPLACE VALVE AORTIC MINIMALLY INVASIVE; Minimally Invasive Aortic Valve Replacement on pump oxygenator , Trans-Esophageal Cardiac Echogram performed by ; Surgeon:EDEL SALAZAR; Location: OR  --- used porcine      VASCULAR SURGERY  03/12    stent placed in superior mesenteric artery for acute ischemia       Social History   Substance Use Topics     Smoking status: Current Every Day Smoker      Packs/day: 0.50     Years: 60.00     Types: Cigarettes     Smokeless tobacco: Never Used      Comment: 7 cigs a day     Alcohol use No      Comment: rarely; consumed heavy use prior     Family History   Problem Relation Age of Onset     HEART DISEASE Father      mi age 72     HEART DISEASE Brother      MI age 44         Current Outpatient Prescriptions   Medication Sig Dispense Refill     ezetimibe (ZETIA) 10 MG tablet Take 1 tablet (10 mg) by mouth daily For cholesterol control 90 tablet 4     divalproex (DEPAKOTE ER) 250 MG 24 hr tablet Take 1 tablet (250 mg) by mouth 2 times daily 120 tablet 3     metoprolol (TOPROL-XL) 100 MG 24 hr tablet TAKE 1 TABLET (100 MG) BY MOUTH DAILY FOR HEART AND BLOOD PRESSURE 90 tablet 3     aspirin 81 MG chewable tablet Take 81 mg by mouth       bumetanide (BUMEX) 1 MG tablet Take 2 mg by mouth       clopidogrel (PLAVIX) 75 MG tablet Take 1 tablet (75 mg) by mouth daily To reduce risk of stroke 90 tablet 3     simvastatin (ZOCOR) 40 MG tablet Take 1 tablet (40 mg) by mouth At Bedtime .  Due for cholesterol check. 90 tablet 4     omeprazole (PRILOSEC) 20 MG CR capsule TAKE 1 CAPSULE (20 MG) BY MOUTH DAILY 90 capsule 1     carboxymethylcellulose (REFRESH) 1 % ophthalmic solution 1 drop 3 times daily.       VITAMIN D 1000 UNIT OR TABS 1 TABLET DAILY 90 3     nitroGLYcerin (NITRO-DUR) 0.2 MG/HR 24 hr patch Place 1 patch onto the skin daily       donepezil (ARICEPT) 5 MG tablet TAKE ONE TABLET BY MOUTH AT BEDTIME (Patient not taking: Reported on 10/19/2017) 90 tablet 1     No Known Allergies  Recent Labs   Lab Test  10/19/17   1443  08/04/17   1155  02/13/17   0928  02/10/16   1313  06/04/15   1005  02/25/15   1104   08/14/13   0739   11/14/12   1018  08/14/12   1004   01/26/12   1430   09/13/10   0956   A1C   --    --    --   5.9   --    --    --    --    --    --   5.9   --   5.8   < >   --    LDL   --    --   67  62   --   98   < >  79   < >  71  75   --    --    < >   --    HDL   --     --   54   --    --    --    --   38*   --   37*   --    --    --    < >   --    TRIG   --    --   86   --    --    --    --   69   --   84   --    --    --    < >   --    ALT  19   --   20   --    --   19   < >  21   --   17  7   < >   --    < >   --    CR   --   0.95  0.92  0.97  0.97  0.96   < >  1.05   < >  0.95  1.02   < >   --    < >   --    GFRESTIMATED   --   75  78  74  74  75   < >  68   < >  76  70   < >   --    < >   --    GFRESTBLACK   --   >90   GFR Calc    >90   GFR Calc    90  90  >90   GFR Calc     < >  82   < >  >90  85   < >   --    < >   --    POTASSIUM   --   3.8  4.3  4.3  4.4  4.6   < >  5.1   < >  4.2  5.0   < >   --    < >   --    TSH   --    --    --    --   0.77   --    --    --    --    --    --    --    --    --   0.47    < > = values in this interval not displayed.      BP Readings from Last 3 Encounters:   10/19/17 118/57   08/04/17 121/55   05/15/17 133/62    Wt Readings from Last 3 Encounters:   08/04/17 135 lb (61.2 kg)   05/15/17 135 lb (61.2 kg)   02/13/17 135 lb (61.2 kg)                          Reviewed and updated as needed this visit by clinical staff       Reviewed and updated as needed this visit by Provider         ROS:  Constitutional, HEENT, cardiovascular, pulmonary, gi and gu systems are negative, except as otherwise noted.      OBJECTIVE:   /57 (BP Location: Right arm, Patient Position: Chair, Cuff Size: Adult Regular)  Pulse 54  Temp 97.3  F (36.3  C) (Oral)  SpO2 98%  There is no height or weight on file to calculate BMI.  GENERAL: healthy, alert and no distress  NECK: no adenopathy, no asymmetry, masses, or scars and thyroid normal to palpation  RESP: lungs clear to auscultation - no rales, rhonchi or wheezes  CV: regular rate and rhythm, normal S1 S2, no S3 or S4, no murmur, click or rub, no peripheral edema and peripheral pulses strong  ABDOMEN: soft, nontender, no hepatosplenomegaly, no masses and bowel  sounds normal  MS: no gross musculoskeletal defects noted, no edema    Diagnostic Test Results:  Results for orders placed or performed in visit on 10/19/17   CBC with platelets and differential   Result Value Ref Range    WBC 8.9 4.0 - 11.0 10e9/L    RBC Count 4.81 4.4 - 5.9 10e12/L    Hemoglobin 10.3 (L) 13.3 - 17.7 g/dL    Hematocrit 34.5 (L) 40.0 - 53.0 %    MCV 72 (L) 78 - 100 fl    MCH 21.4 (L) 26.5 - 33.0 pg    MCHC 29.9 (L) 31.5 - 36.5 g/dL    RDW 21.9 (H) 10.0 - 15.0 %    Platelet Count 163 150 - 450 10e9/L    Diff Method Automated Method     % Neutrophils 54.9 %    % Lymphocytes 29.7 %    % Monocytes 13.3 %    % Eosinophils 1.5 %    % Basophils 0.6 %    Absolute Neutrophil 4.9 1.6 - 8.3 10e9/L    Absolute Lymphocytes 2.6 0.8 - 5.3 10e9/L    Absolute Monocytes 1.2 0.0 - 1.3 10e9/L    Absolute Eosinophils 0.1 0.0 - 0.7 10e9/L    Absolute Basophils 0.1 0.0 - 0.2 10e9/L   Hepatic panel (Albumin, ALT, AST, Bili, Alk Phos, TP)   Result Value Ref Range    Bilirubin Direct <0.1 0.0 - 0.2 mg/dL    Bilirubin Total 0.3 0.2 - 1.3 mg/dL    Albumin 3.6 3.4 - 5.0 g/dL    Protein Total 7.4 6.8 - 8.8 g/dL    Alkaline Phosphatase 79 40 - 150 U/L    ALT 19 0 - 70 U/L    AST 18 0 - 45 U/L   Valproic acid   Result Value Ref Range    Valproic Acid Level 85 50 - 100 mg/L       ASSESSMENT/PLAN:       ICD-10-CM    1. 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 Hyperlipidemia LDL goal <70 E78.5 ezetimibe (ZETIA) 10 MG tablet   3. LVH (left ventricular hypertrophy) I51.7 metoprolol (TOPROL-XL) 100 MG 24 hr tablet   4. Need for prophylactic vaccination and inoculation against influenza Z23 FLU VACCINE, INCREASED ANTIGEN, PRESV FREE, AGE 65+ [23877]     Vaccine Administration, Initial [59540]             Arturo Ware MD  [facility]  Injectable Influenza  Immunization Documentation    1.  Is the person to be vaccinated sick today?   No    2. Does the person to be vaccinated have an allergy to a component   of the vaccine?   No  Egg Allergy Algorithm Link    3. Has the person to be vaccinated ever had a serious reaction   to influenza vaccine in the past?   No    4. Has the person to be vaccinated ever had Guillain-Barré syndrome?   No    Form completed by Yasmeen Chaudhary MA

## 2017-10-19 NOTE — MR AVS SNAPSHOT
"              After Visit Summary   10/19/2017    Justin Doan    MRN: [id number]           Patient Information     Date Of Birth          11/14/1932        Visit Information        Provider Department      10/19/2017 2:00 PM Arturo Ware MD [facility]        Today's Diagnoses     Moderate major depression (H)    -  1    Hyperlipidemia LDL goal <70        LVH (left ventricular hypertrophy)           Follow-ups after your visit        Your next 10 appointments already scheduled     Nov 29, 2017  2:20 PM CST   SHORT with Arturo Ware MD   [facility] ([facility])    [address] [place] [id number] [phone]              Who to contact     If you have questions or need follow up information about today's clinic visit or your schedule please contact [facility] directly at [phone].  Normal or non-critical lab and imaging results will be communicated to you by [email or web address], letter or phone within 4 business days after the clinic has received the results. If you do not hear from us within 7 days, please contact the clinic through MyChart or phone. If you have a critical or abnormal lab result, we will notify you by phone as soon as possible.  Submit refill requests through [employer] or call your pharmacy and they will forward the refill request to us. Please allow 3 business days for your refill to be completed.          Additional Information About Your Visit        MyChart Information     [employer] lets you send messages to your doctor, view your test results, renew your prescriptions, schedule appointments and more. To sign up, go to [email or web address] . Click on \"Log in\" on the left side of the screen, which will take you to the Welcome page. Then click on \"Sign up Now\" on the right side of the page.     You will be asked to enter the access code listed below, as well as some " personal information. Please follow the directions to create your username and password.     Your access code is: [email or web address]  Expires: 2018  2:40 PM     Your access code will  in 90 days. If you need help or a new code, please call your [place] clinic or [phone].        Care EveryWhere ID     This is your Care EveryWhere ID. This could be used by other organizations to access your [place] medical records  [id number]        Your Vitals Were     Pulse Temperature Pulse Oximetry             54 97.3  F (36.3  C) (Oral) 98%          Blood Pressure from Last 3 Encounters:   10/19/17 118/57   17 121/55   05/15/17 133/62    Weight from Last 3 Encounters:   17 135 lb (61.2 kg)   05/15/17 135 lb (61.2 kg)   17 135 lb (61.2 kg)              We Performed the Following     CBC with platelets and differential     Hepatic panel (Albumin, ALT, AST, Bili, Alk Phos, TP)     Valproic acid          Today's Medication Changes          These changes are accurate as of: 10/19/17  2:40 PM.  If you have any questions, ask your nurse or doctor.               These medicines have changed or have updated prescriptions.        Dose/Directions    metoprolol 100 MG 24 hr tablet   Commonly known as:  TOPROL-XL   This may have changed:  See the new instructions.   Used for:  LVH (left ventricular hypertrophy)   Changed by:  Arturo Ware MD        TAKE 1 TABLET (100 MG) BY MOUTH DAILY FOR HEART AND BLOOD PRESSURE   Quantity:  90 tablet   Refills:  3            Where to get your medicines      These medications were sent to [facility] [address] [place] [address]     Phone:  [phone]     divalproex 250 MG 24 hr tablet    ezetimibe 10 MG tablet    metoprolol 100 MG 24 hr tablet                Primary Care Provider Office Phone # Fax #    Arturo Ware [phone]       [address] [facility] [address]         Equal Access to Services     [facility]: Hadii marilou bradford omarmirta Vibhaali, waaxda luqadaha, qaybta kadivinehayes bagley. So [facility] [phone].    ATENCIÓN: Si habla katherine, tiene a garrison disposición servicios gratuitos de asistencia lingüística. Llame al [phone].    We comply with applicable federal civil rights laws and [place] laws. We do not discriminate on the basis of race, color, national origin, age, disability, sex, sexual orientation, or gender identity.            Thank you!     Thank you for choosing [facility]  for your care. Our goal is always to provide you with excellent care. Hearing back from our patients is one way we can continue to improve our services. Please take a few minutes to complete the written survey that you may receive in the mail after your visit with us. Thank you!             Your Updated Medication List - Protect others around you: Learn how to safely use, store and throw away your medicines at www.disposemymeds.org.          This list is accurate as of: 10/19/17  2:40 PM.  Always use your most recent med list.                   Brand Name Dispense Instructions for use Diagnosis    aspirin 81 MG chewable tablet      Take 81 mg by mouth    CHF NYHA class III (symptoms with mildly strenuous activities), chronic, systolic (H), PAD (peripheral artery disease) (H)       bumetanide 1 MG tablet    BUMEX     Take 2 mg by mouth    CHF NYHA class III (symptoms with mildly strenuous activities), chronic, systolic (H), PAD (peripheral artery disease) (H)       cholecalciferol 1000 UNIT tablet    vitamin D    90    1 TABLET DAILY    Hypovitaminosis D       clopidogrel 75 MG tablet    PLAVIX    90 tablet    Take 1 tablet (75 mg) by mouth daily To reduce risk of stroke    CVD (cerebrovascular disease)       divalproex 250 MG 24 hr tablet    DEPAKOTE ER    120 tablet    Take 1 tablet (250 mg) by mouth 2 times daily    Moderate  major depression (H)       donepezil 5 MG tablet    ARIcept    90 tablet    TAKE ONE TABLET BY MOUTH AT BEDTIME    Short-term memory loss       ezetimibe 10 MG tablet    ZETIA    90 tablet    Take 1 tablet (10 mg) by mouth daily For cholesterol control    Hyperlipidemia LDL goal <70       metoprolol 100 MG 24 hr tablet    TOPROL-XL    90 tablet    TAKE 1 TABLET (100 MG) BY MOUTH DAILY FOR HEART AND BLOOD PRESSURE    LVH (left ventricular hypertrophy)       NITRO-DUR 0.2 MG/HR 24 hr patch   Generic drug:  nitroGLYcerin      Place 1 patch onto the skin daily        omeprazole 20 MG CR capsule    priLOSEC    90 capsule    TAKE 1 CAPSULE (20 MG) BY MOUTH DAILY    Gastroesophageal reflux disease without esophagitis       REFRESH 1 % ophthalmic solution   Generic drug:  carboxymethylcellulose      1 drop 3 times daily.        simvastatin 40 MG tablet    ZOCOR    90 tablet    Take 1 tablet (40 mg) by mouth At Bedtime .  Due for cholesterol check.    Hyperlipidemia LDL goal <70

## 2017-10-19 NOTE — LETTER
[facility]  [address] [place]  [address]  [phone]        October 20, 2017    Justin Doan  [address] [place] [address]        Dear Justin,    Labs look good.  Continue medications     Results for orders placed or performed in visit on 10/19/17   CBC with platelets and differential   Result Value Ref Range    WBC 8.9 4.0 - 11.0 10e9/L    RBC Count 4.81 4.4 - 5.9 10e12/L    Hemoglobin 10.3 (L) 13.3 - 17.7 g/dL    Hematocrit 34.5 (L) 40.0 - 53.0 %    MCV 72 (L) 78 - 100 fl    MCH 21.4 (L) 26.5 - 33.0 pg    MCHC 29.9 (L) 31.5 - 36.5 g/dL    RDW 21.9 (H) 10.0 - 15.0 %    Platelet Count 163 150 - 450 10e9/L    Diff Method Automated Method     % Neutrophils 54.9 %    % Lymphocytes 29.7 %    % Monocytes 13.3 %    % Eosinophils 1.5 %    % Basophils 0.6 %    Absolute Neutrophil 4.9 1.6 - 8.3 10e9/L    Absolute Lymphocytes 2.6 0.8 - 5.3 10e9/L    Absolute Monocytes 1.2 0.0 - 1.3 10e9/L    Absolute Eosinophils 0.1 0.0 - 0.7 10e9/L    Absolute Basophils 0.1 0.0 - 0.2 10e9/L   Hepatic panel (Albumin, ALT, AST, Bili, Alk Phos, TP)   Result Value Ref Range    Bilirubin Direct <0.1 0.0 - 0.2 mg/dL    Bilirubin Total 0.3 0.2 - 1.3 mg/dL    Albumin 3.6 3.4 - 5.0 g/dL    Protein Total 7.4 6.8 - 8.8 g/dL    Alkaline Phosphatase 79 40 - 150 U/L    ALT 19 0 - 70 U/L    AST 18 0 - 45 U/L   Valproic acid   Result Value Ref Range    Valproic Acid Level 85 50 - 100 mg/L       If you have any questions please call the clinic at [phone].    Sincerely,    Arturo ANGL

## 2017-10-20 ENCOUNTER — TELEPHONE (OUTPATIENT)
Dept: FAMILY MEDICINE | Facility: CLINIC | Age: 82
End: 2017-10-20

## 2017-10-20 NOTE — TELEPHONE ENCOUNTER
Reason for Call:  Other Information    Detailed comments: Jun Jean from [facility] called and stated he went to see the patient today and he refused PT, please call back if you would like to discuss further at [phone]  Phone Number can be reached at: [phone]  Best Time:Anytime  Can we leave a detailed message on this number? YES    Call taken on 10/20/2017 at 1:44 PM by Magdalena Horne

## 2017-10-23 ENCOUNTER — TELEPHONE (OUTPATIENT)
Dept: FAMILY MEDICINE | Facility: CLINIC | Age: 82
End: 2017-10-23

## 2017-10-23 DIAGNOSIS — I50.22: ICD-10-CM

## 2017-10-23 DIAGNOSIS — I73.9 PAD (PERIPHERAL ARTERY DISEASE) (H): ICD-10-CM

## 2017-10-23 RX ORDER — BUMETANIDE 1 MG/1
2 TABLET ORAL DAILY
Qty: 90 TABLET | Refills: 3 | Status: SHIPPED | OUTPATIENT
Start: 2017-10-23

## 2017-10-23 NOTE — TELEPHONE ENCOUNTER
Reason for Call:  Medication or medication refill:    Do you use a [facility]?  Name of the pharmacy and phone number for the current request:  [facility]    Name of the medication requested: diuretic    Other request: Patient was expecting a diuretic to be prescribed after 10/19/17 appointment.  Please call pharmacy and/or patient to advise.    Can we leave a detailed message on this number? YES    Phone number patient can be reached at: Other phone number:  [phone] to reach [facility].     Best Time:       Call taken on 10/23/2017 at 3:37 PM by Myles German

## 2017-10-24 ENCOUNTER — CARE COORDINATION (OUTPATIENT)
Dept: CARE COORDINATION | Facility: CLINIC | Age: 82
End: 2017-10-24

## 2017-10-24 ENCOUNTER — TELEPHONE (OUTPATIENT)
Dept: FAMILY MEDICINE | Facility: CLINIC | Age: 82
End: 2017-10-24

## 2017-10-24 NOTE — TELEPHONE ENCOUNTER
Forms received from: [employer]   Phone number listed: [phone]   Fax listed: [phone]  Date received: 10-24-17  Form description: PT DC/order summary  Once forms are completed, please return to [employer] via fax.  Is patient requesting to be contacted when forms are completed: n/a  Form placed: provider destanner Emmanuel

## 2017-10-30 ENCOUNTER — MEDICAL CORRESPONDENCE (OUTPATIENT)
Dept: HEALTH INFORMATION MANAGEMENT | Facility: CLINIC | Age: 82
End: 2017-10-30

## 2017-10-30 ENCOUNTER — TELEPHONE (OUTPATIENT)
Dept: FAMILY MEDICINE | Facility: CLINIC | Age: 82
End: 2017-10-30

## 2017-10-30 NOTE — TELEPHONE ENCOUNTER
Forms received from: [employer]   Phone number listed: [phone]   Fax listed: [phone]  Date received: 10-30-17  Form description: OT  Once forms are completed, please return to [employer] via fax.  Is patient requesting to be contacted when forms are completed: n/a  Form placed: provider desk  Heydi Emmanuel

## 2017-10-31 ENCOUNTER — TELEPHONE (OUTPATIENT)
Dept: FAMILY MEDICINE | Facility: CLINIC | Age: 82
End: 2017-10-31

## 2017-10-31 NOTE — TELEPHONE ENCOUNTER
Forms received from: [employer]   Phone number listed: [phone]   Fax listed: [phone]  Date received: 10-31-17  Form description: RN 2 wk 2, 1 wk 7  Once forms are completed, please return to [employer] via fax.  Is patient requesting to be contacted when forms are completed: n/a  Form placed: provider zuhair Emmanuel

## 2017-10-31 NOTE — PROGRESS NOTES
Clinic Care Coordination Contact  OUTREACH    Referral Information:  Referral Source: IP/TCU Report  Reason for Contact: TCU discharge         Universal Utilization:   Last PCP appointment: 10/19/17    Clinical Concerns:  Current Medical Concerns:   Patient Active Problem List   Diagnosis     COPD (chronic obstructive pulmonary disease) (H)     Renal artery stenosis (H)     PAD (peripheral artery disease) (H)     GERD (gastroesophageal reflux disease)     Insulin resistance     Tobacco abuse     Advanced directives, counseling/discussion     Hyperlipidemia LDL goal <70     CVD (cerebrovascular disease)     Health Care Home     Thrombocytopenia (H)     S/P carotid endarterectomy     Pseudophakia, ou     ITP (idiopathic thrombocytopenic purpura)     Abdominal aortic aneurysm (H)     Occlusion of superior mesenteric artery (H)     HTN, goal below 150/90     Short-term memory loss     Posterior vitreous detachment, bilateral     Conjunctival cyst, left       Current Behavioral Concerns: Spoke with daughter regarding her father. Denied concerns.     Education Provided to patient: To call the clinic with concerns.      Medication Management:  Medications managed by family and home care RN. Denied concerns.      Functional Status:  Transportation: Family      Psychosocial:  Current living arrangement:: I live alone  Financial/Insurance: Denied financial concerns. [facility]      Resources and Interventions:  Current Resources: Home Care;         Barriers: Not wanting to lose independence. Lives alone.   Strengths: Family involved and supportive.   Patient/Caregiver understanding: Spoke with the patients daughter regarding how the transition to home has been. Daughter stated the patient seems to be doing well. Patient does have home care involved so he is managed by an RN and utilizing PT/OT. Daughter stated the patient is having a hard time with losing his independence. The patient recently was told he is unable to  drive and the family had to physically remove the car. Family is very supportive and involved. Daughter mentioned that they do have a back plan (facility living) if staying in his home does not work out. Daughter had no other concerns.   Frequency of Care Coordination:  (no further outreaches. Pt has home care)  Upcoming appointment: 11/29/17     Plan:  1. Care Coordinator will assess when home care is completed and reach out after discharge. Patient and family understand to call the clinic with concerns or needs at this time. Family denied concerns as of right now.     Eve Brewer RN  Clinic Care Coordinator  [phone]  [email or web address]

## 2017-10-31 NOTE — PROGRESS NOTES
Clinic Care Coordination Contact  OUTREACH    Referral Information:  Referral Source: IP/TCU Report  Reason for Contact: ***        Universal Utilization:                       Clinical Concerns:  Current Medical Concerns: ***    Current Behavioral Concerns: ***    Education Provided to patient: ***      Clinical Pathway: {Clinical Pathway:329001}    Medication Management:  ***     Functional Status:        Transportation: ***           Psychosocial:     Financial/Insurance: ***  ***     Resources and Interventions:  Current Resources:  ;                   Goals:                  Barriers: ***  Strengths: ***  Patient/Caregiver understanding: ***           Plan: ***

## 2017-11-01 ENCOUNTER — TELEPHONE (OUTPATIENT)
Dept: FAMILY MEDICINE | Facility: CLINIC | Age: 82
End: 2017-11-01
Payer: COMMERCIAL

## 2017-11-01 DIAGNOSIS — I50.31 ACUTE DIASTOLIC CONGESTIVE HEART FAILURE (H): Primary | ICD-10-CM

## 2017-11-01 NOTE — TELEPHONE ENCOUNTER
Forms received from: [employer]   Phone number listed: [phone]   Fax listed: [phone]  Date received: 11-1-17  Form description: [facility]  Once forms are completed, please return to [employer] via fax.  Is patient requesting to be contacted when forms are completed: n/a  Form placed: RN folder  Heydi Emmanuel    Form given to RN to review med list.  Order pended for provider to sign.

## 2017-11-01 NOTE — TELEPHONE ENCOUNTER
Medications reconciled on [facility] form, changes noted on form.  Form to PCP for signature.    Zoie Vazquez RN  [facility]

## 2017-11-02 DIAGNOSIS — Z53.9 DIAGNOSIS NOT YET DEFINED: Primary | ICD-10-CM

## 2017-11-02 PROCEDURE — G0180 MD CERTIFICATION HHA PATIENT: HCPCS | Performed by: INTERNAL MEDICINE

## 2017-11-02 RX ORDER — ACETAMINOPHEN 325 MG/1
650 TABLET ORAL EVERY 6 HOURS PRN
Qty: 100 TABLET | Refills: 0 | COMMUNITY
Start: 2017-11-01

## 2017-11-09 ENCOUNTER — TELEPHONE (OUTPATIENT)
Dept: FAMILY MEDICINE | Facility: CLINIC | Age: 82
End: 2017-11-09

## 2017-11-09 ENCOUNTER — MEDICAL CORRESPONDENCE (OUTPATIENT)
Dept: HEALTH INFORMATION MANAGEMENT | Facility: CLINIC | Age: 82
End: 2017-11-09

## 2017-11-09 NOTE — TELEPHONE ENCOUNTER
Forms received from: [employer]   Phone number listed: [phone]   Fax listed: [phone]  Date received: 11-9-17  Form description: OK to admit for PT, OT eval and treat  Once forms are completed, please return to [employer] via fax.  Is patient requesting to be contacted when forms are completed: n/a  Form placed: provider zuhair Emmanuel

## 2017-11-10 ENCOUNTER — TELEPHONE (OUTPATIENT)
Dept: FAMILY MEDICINE | Facility: CLINIC | Age: 82
End: 2017-11-10

## 2017-11-10 NOTE — TELEPHONE ENCOUNTER
Forms received from: [employer]   Phone number listed: [phone]   Fax listed: [phone]  Date received: 11-10-17  Form description:  DC summary  Once forms are completed, please return to [employer] via fax.  Is patient requesting to be contacted when forms are completed: n/a  Form placed: provider desk  Heydi Emmanuel

## 2017-11-13 ENCOUNTER — MEDICAL CORRESPONDENCE (OUTPATIENT)
Dept: HEALTH INFORMATION MANAGEMENT | Facility: CLINIC | Age: 82
End: 2017-11-13

## 2017-11-29 ENCOUNTER — OFFICE VISIT (OUTPATIENT)
Dept: FAMILY MEDICINE | Facility: CLINIC | Age: 82
End: 2017-11-29
Payer: COMMERCIAL

## 2017-11-29 VITALS
TEMPERATURE: 97 F | BODY MASS INDEX: 23.22 KG/M2 | SYSTOLIC BLOOD PRESSURE: 130 MMHG | DIASTOLIC BLOOD PRESSURE: 73 MMHG | WEIGHT: 129 LBS | OXYGEN SATURATION: 100 % | HEART RATE: 74 BPM

## 2017-11-29 DIAGNOSIS — I70.1 RENAL ARTERY STENOSIS (H): ICD-10-CM

## 2017-11-29 DIAGNOSIS — K55.069 OCCLUSION OF SUPERIOR MESENTERIC ARTERY (H): ICD-10-CM

## 2017-11-29 DIAGNOSIS — H91.90 HEARING DIFFICULTY, UNSPECIFIED LATERALITY: Primary | ICD-10-CM

## 2017-11-29 DIAGNOSIS — G30.0 EARLY ONSET ALZHEIMER'S DISEASE WITH BEHAVIORAL DISTURBANCE (H): ICD-10-CM

## 2017-11-29 DIAGNOSIS — I73.9 PAD (PERIPHERAL ARTERY DISEASE) (H): ICD-10-CM

## 2017-11-29 DIAGNOSIS — F02.818 EARLY ONSET ALZHEIMER'S DISEASE WITH BEHAVIORAL DISTURBANCE (H): ICD-10-CM

## 2017-11-29 DIAGNOSIS — J43.1 PANLOBULAR EMPHYSEMA (H): ICD-10-CM

## 2017-11-29 DIAGNOSIS — D69.6 THROMBOCYTOPENIA (H): ICD-10-CM

## 2017-11-29 PROCEDURE — 99214 OFFICE O/P EST MOD 30 MIN: CPT | Performed by: INTERNAL MEDICINE

## 2017-11-29 ASSESSMENT — PAIN SCALES - GENERAL: PAINLEVEL: NO PAIN (0)

## 2017-11-29 NOTE — MR AVS SNAPSHOT
After Visit Summary   11/29/2017    Justin Doan    MRN: [id number]           Patient Information     Date Of Birth          11/14/1932        Visit Information        Provider Department      11/29/2017 2:20 PM Arturo Ware MD [facility]        Today's Diagnoses     Hearing difficulty, unspecified laterality    -  1       Follow-ups after your visit        Additional Services     AUDIOLOGY ADULT REFERRAL       Your provider has referred you to: [facility]: [facility] [phone]   [email or web address]    Treatment:  Evaluation & Treatment  Specialty Testing:  Hearing Aid Consultation    Please be aware that coverage of these services is subject to the terms and limitations of your health insurance plan.  Call member services at your health plan with any benefit or coverage questions.      Please bring the following to your appointment:  >>   Any x-rays, CTs or MRIs which have been performed.  Contact the facility where they were done to arrange for  prior to your scheduled appointment.   >>   List of current medications  >>   This referral request   >>   Any documents/labs given to you for this referral                  Who to contact     If you have questions or need follow up information about today's clinic visit or your schedule please contact [facility] directly at [phone].  Normal or non-critical lab and imaging results will be communicated to you by MyChart, letter or phone within 4 business days after the clinic has received the results. If you do not hear from us within 7 days, please contact the clinic through MyChart or phone. If you have a critical or abnormal lab result, we will notify you by phone as soon as possible.  Submit refill requests through [employer] or call your pharmacy and they will forward the refill request to us. Please allow 3 business days for your refill to be  "completed.          Additional Information About Your Visit        [employer] Information     [employer] lets you send messages to your doctor, view your test results, renew your prescriptions, schedule appointments and more. To sign up, go to [email or web address] . Click on \"Log in\" on the left side of the screen, which will take you to the Welcome page. Then click on \"Sign up Now\" on the right side of the page.     You will be asked to enter the access code listed below, as well as some personal information. Please follow the directions to create your username and password.     Your access code is: [email or web address]  Expires: 2018  1:40 PM     Your access code will  in 90 days. If you need help or a new code, please call your [place] clinic or [phone].        Care EveryWhere ID     This is your Care EveryWhere ID. This could be used by other organizations to access your [place] medical records  [id number]        Your Vitals Were     Pulse Temperature Pulse Oximetry BMI (Body Mass Index)          74 97  F (36.1  C) (Oral) 100% 23.22 kg/m2         Blood Pressure from Last 3 Encounters:   17 130/73   10/19/17 118/57   17 121/55    Weight from Last 3 Encounters:   17 129 lb (58.5 kg)   17 135 lb (61.2 kg)   05/15/17 135 lb (61.2 kg)              We Performed the Following     AUDIOLOGY ADULT REFERRAL        Primary Care Provider Office Phone # Fax #    Arturo Ware [phone]       [address] [facility] [address]        Equal Access to Services     [facility] : Hadmor Dasilva, pamella ball, qahayes varma. So [facility] [phone].    ATENCIÓN: Si habla español, tiene a garrison disposición servicios gratuitos de asistencia lingüística. Balbir al [phone].    We comply with applicable federal civil rights laws and [place] laws. We do not discriminate on the basis of race, color, " national origin, age, disability, sex, sexual orientation, or gender identity.            Thank you!     Thank you for choosing [facility]  for your care. Our goal is always to provide you with excellent care. Hearing back from our patients is one way we can continue to improve our services. Please take a few minutes to complete the written survey that you may receive in the mail after your visit with us. Thank you!             Your Updated Medication List - Protect others around you: Learn how to safely use, store and throw away your medicines at www.disposemymeds.org.          This list is accurate as of: 11/29/17  2:54 PM.  Always use your most recent med list.                   Brand Name Dispense Instructions for use Diagnosis    acetaminophen 325 MG tablet    TYLENOL    100 tablet    Take 2 tablets (650 mg) by mouth every 6 hours as needed for mild pain        aspirin 81 MG chewable tablet      Take 81 mg by mouth    CHF NYHA class III (symptoms with mildly strenuous activities), chronic, systolic (H), PAD (peripheral artery disease) (H)       bumetanide 1 MG tablet    BUMEX    90 tablet    Take 2 tablets (2 mg) by mouth daily    CHF NYHA class III (symptoms with mildly strenuous activities), chronic, systolic (H), PAD (peripheral artery disease) (H)       cholecalciferol 1000 UNIT tablet    vitamin D3    90    1 TABLET DAILY    Hypovitaminosis D       clopidogrel 75 MG tablet    PLAVIX    90 tablet    Take 1 tablet (75 mg) by mouth daily To reduce risk of stroke    CVD (cerebrovascular disease)       divalproex 250 MG 24 hr tablet    DEPAKOTE ER    120 tablet    Take 1 tablet (250 mg) by mouth 2 times daily    Moderate major depression (H)       donepezil 5 MG tablet    ARIcept    90 tablet    TAKE ONE TABLET BY MOUTH AT BEDTIME    Short-term memory loss       ezetimibe 10 MG tablet    ZETIA    90 tablet    Take 1 tablet (10 mg) by mouth daily For cholesterol control    Hyperlipidemia  LDL goal <70       metoprolol 100 MG 24 hr tablet    TOPROL-XL    90 tablet    TAKE 1 TABLET (100 MG) BY MOUTH DAILY FOR HEART AND BLOOD PRESSURE    LVH (left ventricular hypertrophy)       omeprazole 20 MG CR capsule    priLOSEC    90 capsule    TAKE 1 CAPSULE (20 MG) BY MOUTH DAILY    Gastroesophageal reflux disease without esophagitis       REFRESH 1 % ophthalmic solution   Generic drug:  carboxymethylcellulose      1 drop 3 times daily.        simvastatin 40 MG tablet    ZOCOR    90 tablet    Take 1 tablet (40 mg) by mouth At Bedtime .  Due for cholesterol check.    Hyperlipidemia LDL goal <70

## 2017-11-29 NOTE — NURSING NOTE
"Chief Complaint   Patient presents with     Recheck Medication       Initial /73 (BP Location: Right arm, Patient Position: Chair, Cuff Size: Adult Regular)  Pulse 74  Temp 97  F (36.1  C) (Oral)  Wt 129 lb (58.5 kg)  SpO2 100%  BMI 23.22 kg/m2 Estimated body mass index is 23.22 kg/(m^2) as calculated from the following:    Height as of 2/13/17: 5' 2.5\" (1.588 m).    Weight as of this encounter: 129 lb (58.5 kg).  Medication Reconciliation: complete   Yasmeen Chaudhary MA      "

## 2017-11-29 NOTE — PROGRESS NOTES
SUBJECTIVE:   Justin Doan is a 85 year old male who presents to clinic today for the following health issues:      Hyperlipidemia Follow-Up      Rate your low fat/cholesterol diet?: good    Taking statin?  Yes, no muscle aches from statin    Other lipid medications/supplements?:  none    Hypertension Follow-up      Outpatient blood pressures are not being checked.    Low Salt Diet: no added salt    Vascular Disease Follow-up:  Peripheral Vascular Disease (PVD)      Chest pain or pressure, left side neck or arm pain: No    Shortness of breath/increased sweats/nausea with exertion: No    Pain in calves walking 1-2 blocks: No    Worsened or new symptoms since last visit: No    Nitroglycerin use: no    Daily aspirin use: Yes    COPD Follow Up    Status: Same; no change    Coughing: No    Fevers: No    Walking about 100 feet before stopping to catch his breath        Amount of exercise or physical activity: None    Problems taking medications regularly: No    Medication side effects: none    Diet: low salt and low fat/cholesterol          Problem list and histories reviewed & adjusted, as indicated.  Additional history: as documented    Patient Active Problem List   Diagnosis     COPD (chronic obstructive pulmonary disease) (H)     Renal artery stenosis (H)     PAD (peripheral artery disease) (H)     GERD (gastroesophageal reflux disease)     Insulin resistance     Tobacco abuse     Advanced directives, counseling/discussion     Hyperlipidemia LDL goal <70     CVD (cerebrovascular disease)     Health Care Home     Thrombocytopenia (H)     S/P carotid endarterectomy     Pseudophakia, ou     ITP (idiopathic thrombocytopenic purpura)     Abdominal aortic aneurysm (H)     Occlusion of superior mesenteric artery (H)     HTN, goal below 150/90     Short-term memory loss     Posterior vitreous detachment, bilateral     Conjunctival cyst, left     Past Surgical History:   Procedure Laterality Date     CATARACT IOL, RT/LT        ENDARTERECTOMY CAROTID  12/21/2012    Procedure: ENDARTERECTOMY CAROTID;  Left Carotid  Endarterectomy ;  Surgeon: Gio Villatoro MD;  Location:  OR     ENT SURGERY      nose surgery to open things up     PHACOEMULSIFICATION WITH STANDARD INTRAOCULAR LENS IMPLANT  10/2013    right eye; left eye     REPLACE VALVE AORTIC MINIMALLY INVASIVE  2/14/2012    Procedure:REPLACE VALVE AORTIC MINIMALLY INVASIVE; Minimally Invasive Aortic Valve Replacement on pump oxygenator , Trans-Esophageal Cardiac Echogram performed by ; Surgeon:EDEL SALAZAR; Location: OR  --- used porcine      VASCULAR SURGERY  03/12    stent placed in superior mesenteric artery for acute ischemia       Social History   Substance Use Topics     Smoking status: Current Every Day Smoker     Packs/day: 0.50     Years: 60.00     Types: Cigarettes     Smokeless tobacco: Never Used      Comment: 7 cigs a day     Alcohol use No      Comment: rarely; consumed heavy use prior     Family History   Problem Relation Age of Onset     HEART DISEASE Father      mi age 72     HEART DISEASE Brother      MI age 44         Current Outpatient Prescriptions   Medication Sig Dispense Refill     acetaminophen (TYLENOL) 325 MG tablet Take 2 tablets (650 mg) by mouth every 6 hours as needed for mild pain 100 tablet 0     bumetanide (BUMEX) 1 MG tablet Take 2 tablets (2 mg) by mouth daily 90 tablet 3     ezetimibe (ZETIA) 10 MG tablet Take 1 tablet (10 mg) by mouth daily For cholesterol control 90 tablet 4     divalproex (DEPAKOTE ER) 250 MG 24 hr tablet Take 1 tablet (250 mg) by mouth 2 times daily 120 tablet 3     metoprolol (TOPROL-XL) 100 MG 24 hr tablet TAKE 1 TABLET (100 MG) BY MOUTH DAILY FOR HEART AND BLOOD PRESSURE 90 tablet 3     aspirin 81 MG chewable tablet Take 81 mg by mouth       donepezil (ARICEPT) 5 MG tablet TAKE ONE TABLET BY MOUTH AT BEDTIME 90 tablet 1     clopidogrel (PLAVIX) 75 MG tablet Take 1 tablet (75 mg) by mouth daily To  reduce risk of stroke 90 tablet 3     simvastatin (ZOCOR) 40 MG tablet Take 1 tablet (40 mg) by mouth At Bedtime .  Due for cholesterol check. 90 tablet 4     omeprazole (PRILOSEC) 20 MG CR capsule TAKE 1 CAPSULE (20 MG) BY MOUTH DAILY 90 capsule 1     carboxymethylcellulose (REFRESH) 1 % ophthalmic solution 1 drop 3 times daily.       VITAMIN D 1000 UNIT OR TABS 1 TABLET DAILY 90 3     No Known Allergies  Recent Labs   Lab Test  10/19/17   1443  08/04/17   1155  02/13/17   0928  02/10/16   1313  06/04/15   1005  02/25/15   1104   08/14/13   0739   11/14/12   1018  08/14/12   1004   01/26/12   1430   09/13/10   0956   A1C   --    --    --   5.9   --    --    --    --    --    --   5.9   --   5.8   < >   --    LDL   --    --   67  62   --   98   < >  79   < >  71  75   --    --    < >   --    HDL   --    --   54   --    --    --    --   38*   --   37*   --    --    --    < >   --    TRIG   --    --   86   --    --    --    --   69   --   84   --    --    --    < >   --    ALT  19   --   20   --    --   19   < >  21   --   17  7   < >   --    < >   --    CR   --   0.95  0.92  0.97  0.97  0.96   < >  1.05   < >  0.95  1.02   < >   --    < >   --    GFRESTIMATED   --   75  78  74  74  75   < >  68   < >  76  70   < >   --    < >   --    GFRESTBLACK   --   >90   GFR Calc    >90   GFR Calc    90  90  >90   GFR Calc     < >  82   < >  >90  85   < >   --    < >   --    POTASSIUM   --   3.8  4.3  4.3  4.4  4.6   < >  5.1   < >  4.2  5.0   < >   --    < >   --    TSH   --    --    --    --   0.77   --    --    --    --    --    --    --    --    --   0.47    < > = values in this interval not displayed.            Reviewed and updated as needed this visit by clinical staffTobacco  Allergies  Meds  Med Hx  Surg Hx  Fam Hx  Soc Hx      Reviewed and updated as needed this visit by Provider         ROS:  Constitutional, HEENT, cardiovascular, pulmonary, gi and gu systems are  negative, except as otherwise noted.      OBJECTIVE:   /73 (BP Location: Right arm, Patient Position: Chair, Cuff Size: Adult Regular)  Pulse 74  Temp 97  F (36.1  C) (Oral)  Wt 129 lb (58.5 kg)  SpO2 100%  BMI 23.22 kg/m2  Body mass index is 23.22 kg/(m^2).  GENERAL: healthy, alert and no distress  EYES: Eyes grossly normal to inspection, PERRL and conjunctivae and sclerae normal  HENT: ear canals and TM's normal, nose and mouth without ulcers or lesions  NECK: no adenopathy, no asymmetry, masses, or scars and thyroid normal to palpation  RESP: lungs clear to auscultation - no rales, rhonchi or wheezes  CV: regular rate and rhythm, normal S1 S2, no S3 or S4, no murmur, click or rub, no peripheral edema and peripheral pulses strong  ABDOMEN: soft, nontender, no hepatosplenomegaly, no masses and bowel sounds normal  MS: no gross musculoskeletal defects noted, no edema  SKIN: no suspicious lesions or rashes  NEURO: Normal strength and tone, mentation intact and speech normal  BACK: no CVA tenderness, no paralumbar tenderness  PSYCH: mentation appears normal, affect normal/bright  LYMPH: no cervical, supraclavicular, axillary, or inguinal adenopathy    Diagnostic Test Results:  Results for orders placed or performed in visit on 10/19/17   CBC with platelets and differential   Result Value Ref Range    WBC 8.9 4.0 - 11.0 10e9/L    RBC Count 4.81 4.4 - 5.9 10e12/L    Hemoglobin 10.3 (L) 13.3 - 17.7 g/dL    Hematocrit 34.5 (L) 40.0 - 53.0 %    MCV 72 (L) 78 - 100 fl    MCH 21.4 (L) 26.5 - 33.0 pg    MCHC 29.9 (L) 31.5 - 36.5 g/dL    RDW 21.9 (H) 10.0 - 15.0 %    Platelet Count 163 150 - 450 10e9/L    Diff Method Automated Method     % Neutrophils 54.9 %    % Lymphocytes 29.7 %    % Monocytes 13.3 %    % Eosinophils 1.5 %    % Basophils 0.6 %    Absolute Neutrophil 4.9 1.6 - 8.3 10e9/L    Absolute Lymphocytes 2.6 0.8 - 5.3 10e9/L    Absolute Monocytes 1.2 0.0 - 1.3 10e9/L    Absolute Eosinophils 0.1 0.0 - 0.7  10e9/L    Absolute Basophils 0.1 0.0 - 0.2 10e9/L   Hepatic panel (Albumin, ALT, AST, Bili, Alk Phos, TP)   Result Value Ref Range    Bilirubin Direct <0.1 0.0 - 0.2 mg/dL    Bilirubin Total 0.3 0.2 - 1.3 mg/dL    Albumin 3.6 3.4 - 5.0 g/dL    Protein Total 7.4 6.8 - 8.8 g/dL    Alkaline Phosphatase 79 40 - 150 U/L    ALT 19 0 - 70 U/L    AST 18 0 - 45 U/L   Valproic acid   Result Value Ref Range    Valproic Acid Level 85 50 - 100 mg/L       ASSESSMENT/PLAN:       ICD-10-CM    1. Hearing difficulty, unspecified laterality H91.90 AUDIOLOGY ADULT REFERRAL   2. Early onset Alzheimer's disease with behavioral disturbance G30.0     F02.81    3. PAD (peripheral artery disease) (H) I73.9    4. Panlobular emphysema (H) J43.1    5. Thrombocytopenia (H) D69.6    6. Renal artery stenosis (H) I70.1    7. Occlusion of superior mesenteric artery (H) K55.069          Arturo Ware MD  [facility]

## 2018-01-10 PROBLEM — F32.1 MODERATE MAJOR DEPRESSION (H): Status: ACTIVE | Noted: 2018-01-10

## 2018-01-10 PROBLEM — I50.32 CHRONIC DIASTOLIC CONGESTIVE HEART FAILURE (H): Status: ACTIVE | Noted: 2018-01-10

## 2018-04-02 ENCOUNTER — PATIENT OUTREACH (OUTPATIENT)
Dept: CARE COORDINATION | Facility: CLINIC | Age: 83
End: 2018-04-02

## 2018-04-02 ENCOUNTER — TELEPHONE (OUTPATIENT)
Dept: CARE COORDINATION | Facility: CLINIC | Age: 83
End: 2018-04-02

## 2018-04-02 DIAGNOSIS — I50.31 ACUTE DIASTOLIC CONGESTIVE HEART FAILURE (H): Primary | ICD-10-CM

## 2018-04-02 NOTE — LETTER
Health Care Home - Access Care Plan    About Me  Patient Name:  Justin Doan    YOB: 1932  Age:                             85 year old   Sukumar MRN:            [id number] Telephone Information:     Home Phone [phone]   Mobile [phone]       Address:    [address] [place] [address] Email address:  No e-mail address on record      Emergency Contact(s)  Name Relationship Lgl Grd Work Phone Home Phone Mobile Phone   1. RICHARD WONG Daughter   [phone]   2. NPP     [phone]              Health Maintenance:      My Access Plan  Medical Emergency 911   Questions or concerns during clinic hours Primary Clinic Line, I will call the clinic directly: [facility] - [phone]   24 Hour Appointment Line [phone] or  [phone]) (toll free)   24 Hour Nurse Line [phone] (toll free)   Questions or concerns outside clinic hours 24 Hour Appointment Line, I will call the after-hours on-call line:   [facility] [phone] or [phone]) (toll-free)   Preferred Urgent Care     Preferred Hospital [facility]  [phone]   Preferred Pharmacy [address]     Behavioral Health Crisis Line The National Suicide Prevention Lifeline at 1-395.471.8469 or 911     My Care Team Members  Patient Care Team       Relationship Specialty Notifications Start End    Arturo Ware MD PCP - General Internal Medicine  12/19/13     Phone: [phone] Fax: [phone]         [address] [facility] [address]    Gerald Phan, [facility] Clinic Care Coordinator Primary Care - CC Admissions 4/2/18     Phone: [phone] Fax: [phone]            My Medical and Care Information  Problem List   Patient Active Problem List   Diagnosis     COPD (chronic obstructive pulmonary disease) (H)     Renal artery stenosis (H)     PAD (peripheral artery  disease) (H)     GERD (gastroesophageal reflux disease)     Insulin resistance     Tobacco abuse     Advanced directives, counseling/discussion     Hyperlipidemia LDL goal <70     CVD (cerebrovascular disease)     Health Care Home     Thrombocytopenia (H)     S/P carotid endarterectomy     Pseudophakia, ou     ITP (idiopathic thrombocytopenic purpura)     Abdominal aortic aneurysm (H)     Occlusion of superior mesenteric artery (H)     HTN, goal below 150/90     Short-term memory loss     Posterior vitreous detachment, bilateral     Conjunctival cyst, left     Moderate major depression (H)     Chronic diastolic congestive heart failure (H)

## 2018-04-02 NOTE — LETTER
[facility] CARE COORDINATION    April 2, 2018    Justin Doan  [address] [place] [address]      Dear Justin,    I am a clinic care coordinator who works with Arturo Ware MD at [facility]. I have been trying to reach you recently to introduce Clinic Care Coordination and to see if there was anything I could assist you with.  I wanted to introduce myself and provide you with my contact information so that you can call me with questions or concerns about your health care. Below is a description of clinic care coordination and how I can further assist you.     The clinic care coordinator is a registered nurse and/or  who understand the health care system. The goal of clinic care coordination is to help you manage your health and improve access to the [facility] system in the most efficient manner. The registered nurse can assist you in meeting your health care goals by providing education, coordinating services, and strengthening the communication among your providers. The  can assist you with financial, behavioral, psychosocial, chemical dependency, counseling, and/or psychiatric resources.    Please feel free to contact me at [phone], with any questions or concerns. We at [facility] are focused on providing you with the highest-quality healthcare experience possible and that all starts with you.     Sincerely,     Gerald Uribe, MSN, RN, PHN I Clinic Care Coordinator  [facility]  Phone: [phone]  [email or web address]    Enclosed: I have enclosed a copy of a 24 Hour Access Plan. This has helpful phone numbers for you to call when needed. Please keep this in an easy to access place to use as needed.

## 2018-04-02 NOTE — PROGRESS NOTES
Clinic Care Coordination Contact  [facility]/Voicemail    Referral Source: Non-[facility]  Clinical Data: Care Coordinator Outreach  Outreach attempted x 1.  Left message on voicemail with call back information and requested return call.  Plan: Care Coordinator will mail out care coordination introduction letter with care coordinator contact information and explanation of care coordination services. Care Coordinator will try to reach patient again in 1-2 business days.      Gerald Uribe, MSN, RN, PHN I Clinic Care Coordinator  [facility]  Phone: [phone]  [email or web address]

## 2018-04-02 NOTE — TELEPHONE ENCOUNTER
DC'd from [facility] on 3/29/18 to Home self care   Primary Problem: Acute diastolic CHF (congestive heart failure)  LACE: 72 High

## 2018-04-04 NOTE — PROGRESS NOTES
Clinic Care Coordination Contact  [facility]/Voicemail    Referral Source: Non-[facility]  Clinical Data: Care Coordinator Outreach  Outreach attempted x 3.  Left message on voicemail with call back information and requested return call.  Plan: Care Coordinator mailed out care coordination introduction letter on 4-2-18. Care Coordinator will do no further outreaches at this time.      Gerald Uribe, MSN, RN, PHN I Clinic Care Coordinator  [facility]  Phone: [phone]  [email or web address]

## 2018-04-23 ENCOUNTER — OFFICE VISIT (OUTPATIENT)
Dept: FAMILY MEDICINE | Facility: CLINIC | Age: 83
End: 2018-04-23
Payer: COMMERCIAL

## 2018-04-23 VITALS
TEMPERATURE: 97.2 F | HEART RATE: 70 BPM | SYSTOLIC BLOOD PRESSURE: 125 MMHG | OXYGEN SATURATION: 98 % | DIASTOLIC BLOOD PRESSURE: 63 MMHG

## 2018-04-23 DIAGNOSIS — D69.3 IDIOPATHIC THROMBOCYTOPENIC PURPURA (H): Primary | ICD-10-CM

## 2018-04-23 DIAGNOSIS — F02.80 LATE ONSET ALZHEIMER'S DISEASE WITHOUT BEHAVIORAL DISTURBANCE (H): ICD-10-CM

## 2018-04-23 DIAGNOSIS — G30.1 LATE ONSET ALZHEIMER'S DISEASE WITHOUT BEHAVIORAL DISTURBANCE (H): ICD-10-CM

## 2018-04-23 DIAGNOSIS — I50.32 CHRONIC DIASTOLIC CONGESTIVE HEART FAILURE (H): ICD-10-CM

## 2018-04-23 DIAGNOSIS — I10 HTN, GOAL BELOW 150/90: ICD-10-CM

## 2018-04-23 DIAGNOSIS — I73.9 PAD (PERIPHERAL ARTERY DISEASE) (H): ICD-10-CM

## 2018-04-23 LAB
ALBUMIN SERPL-MCNC: 3.3 G/DL (ref 3.4–5)
ALP SERPL-CCNC: 71 U/L (ref 40–150)
ALT SERPL W P-5'-P-CCNC: 14 U/L (ref 0–70)
ANION GAP SERPL CALCULATED.3IONS-SCNC: 6 MMOL/L (ref 3–14)
AST SERPL W P-5'-P-CCNC: 17 U/L (ref 0–45)
BASOPHILS # BLD AUTO: 0 10E9/L (ref 0–0.2)
BASOPHILS NFR BLD AUTO: 0.3 %
BILIRUB DIRECT SERPL-MCNC: <0.1 MG/DL (ref 0–0.2)
BILIRUB SERPL-MCNC: 0.2 MG/DL (ref 0.2–1.3)
BUN SERPL-MCNC: 28 MG/DL (ref 7–30)
CALCIUM SERPL-MCNC: 8.7 MG/DL (ref 8.5–10.1)
CHLORIDE SERPL-SCNC: 108 MMOL/L (ref 94–109)
CO2 SERPL-SCNC: 27 MMOL/L (ref 20–32)
CREAT SERPL-MCNC: 1.06 MG/DL (ref 0.66–1.25)
DIFFERENTIAL METHOD BLD: ABNORMAL
EOSINOPHIL # BLD AUTO: 0.2 10E9/L (ref 0–0.7)
EOSINOPHIL NFR BLD AUTO: 3.2 %
ERYTHROCYTE [DISTWIDTH] IN BLOOD BY AUTOMATED COUNT: 18.9 % (ref 10–15)
GFR SERPL CREATININE-BSD FRML MDRD: 66 ML/MIN/1.7M2
GLUCOSE SERPL-MCNC: 100 MG/DL (ref 70–99)
HCT VFR BLD AUTO: 37.7 % (ref 40–53)
HGB BLD-MCNC: 11.8 G/DL (ref 13.3–17.7)
LDLC SERPL DIRECT ASSAY-MCNC: 70 MG/DL
LYMPHOCYTES # BLD AUTO: 2 10E9/L (ref 0.8–5.3)
LYMPHOCYTES NFR BLD AUTO: 32.8 %
MCH RBC QN AUTO: 29.1 PG (ref 26.5–33)
MCHC RBC AUTO-ENTMCNC: 31.3 G/DL (ref 31.5–36.5)
MCV RBC AUTO: 93 FL (ref 78–100)
MONOCYTES # BLD AUTO: 0.9 10E9/L (ref 0–1.3)
MONOCYTES NFR BLD AUTO: 14.2 %
NEUTROPHILS # BLD AUTO: 3 10E9/L (ref 1.6–8.3)
NEUTROPHILS NFR BLD AUTO: 49.5 %
PLATELET # BLD AUTO: 116 10E9/L (ref 150–450)
POTASSIUM SERPL-SCNC: 4.4 MMOL/L (ref 3.4–5.3)
PROT SERPL-MCNC: 6.9 G/DL (ref 6.8–8.8)
RBC # BLD AUTO: 4.06 10E12/L (ref 4.4–5.9)
SODIUM SERPL-SCNC: 141 MMOL/L (ref 133–144)
WBC # BLD AUTO: 6 10E9/L (ref 4–11)

## 2018-04-23 PROCEDURE — 99214 OFFICE O/P EST MOD 30 MIN: CPT | Performed by: INTERNAL MEDICINE

## 2018-04-23 PROCEDURE — 80076 HEPATIC FUNCTION PANEL: CPT | Performed by: INTERNAL MEDICINE

## 2018-04-23 PROCEDURE — 80048 BASIC METABOLIC PNL TOTAL CA: CPT | Performed by: INTERNAL MEDICINE

## 2018-04-23 PROCEDURE — 85025 COMPLETE CBC W/AUTO DIFF WBC: CPT | Performed by: INTERNAL MEDICINE

## 2018-04-23 PROCEDURE — 83721 ASSAY OF BLOOD LIPOPROTEIN: CPT | Performed by: INTERNAL MEDICINE

## 2018-04-23 PROCEDURE — 36415 COLL VENOUS BLD VENIPUNCTURE: CPT | Performed by: INTERNAL MEDICINE

## 2018-04-23 ASSESSMENT — PAIN SCALES - GENERAL: PAINLEVEL: NO PAIN (0)

## 2018-04-23 NOTE — MR AVS SNAPSHOT
"              After Visit Summary   4/23/2018    Justin Doan    MRN: [id number]           Patient Information     Date Of Birth          11/14/1932        Visit Information        Provider Department      4/23/2018 3:00 PM Arturo Ware MD [facility]        Today's Diagnoses     Idiopathic thrombocytopenic purpura (H)    -  1    HTN, goal below 150/90        Chronic diastolic congestive heart failure (H)        PAD (peripheral artery disease) (H)          Care Instructions    Stop taking zetia when run out            Follow-ups after your visit        Who to contact     If you have questions or need follow up information about today's clinic visit or your schedule please contact [facility] directly at [phone].  Normal or non-critical lab and imaging results will be communicated to you by MyChart, letter or phone within 4 business days after the clinic has received the results. If you do not hear from us within 7 days, please contact the clinic through [facility] or phone. If you have a critical or abnormal lab result, we will notify you by phone as soon as possible.  Submit refill requests through [employer] or call your pharmacy and they will forward the refill request to us. Please allow 3 business days for your refill to be completed.          Additional Information About Your Visit        MyChart Information     [employer] lets you send messages to your doctor, view your test results, renew your prescriptions, schedule appointments and more. To sign up, go to [email or web address] . Click on \"Log in\" on the left side of the screen, which will take you to the Welcome page. Then click on \"Sign up Now\" on the right side of the page.     You will be asked to enter the access code listed below, as well as some personal information. Please follow the directions to create your username and password.     Your access code is: [email or web address]  Expires: 7/22/2018  3:23 PM   "   Your access code will  in 90 days. If you need help or a new code, please call your [place] clinic or [phone].        Care EveryWhere ID     This is your Care EveryWhere ID. This could be used by other organizations to access your [facility] medical records  [id number]        Your Vitals Were     Pulse Temperature Pulse Oximetry             70 97.2  F (36.2  C) (Oral) 98%          Blood Pressure from Last 3 Encounters:   18 125/63   17 130/73   10/19/17 118/57    Weight from Last 3 Encounters:   17 129 lb (58.5 kg)   17 135 lb (61.2 kg)   05/15/17 135 lb (61.2 kg)              We Performed the Following     Basic metabolic panel     CBC with platelets and differential     Hepatic panel (Albumin, ALT, AST, Bili, Alk Phos, TP)     LDL cholesterol direct          Today's Medication Changes          These changes are accurate as of 18  3:23 PM.  If you have any questions, ask your nurse or doctor.               Stop taking these medicines if you haven't already. Please contact your care team if you have questions.     ezetimibe 10 MG tablet   Commonly known as:  ZETIA   Stopped by:  Arturo Ware MD                    Primary Care Provider Office Phone # Fax #    Arturo Ware [phone]       [address] [facility] [address]        Equal Access to Services     LUNA RICCI : Hadmor bradford hadasho Sovenkat, waaxda luqadaha, qaybta kaalmada alice, hayes belcher. So [facility] [phone].    ATENCIÓN: Si habla español, tiene a garrison disposición servicios gratuitos de asistencia lingüística. Balbir al [phone].    We comply with applicable federal civil rights laws and [place] laws. We do not discriminate on the basis of race, color, national origin, age, disability, sex, sexual orientation, or gender identity.            Thank you!     Thank you for choosing [facility]  for your care. Our  goal is always to provide you with excellent care. Hearing back from our patients is one way we can continue to improve our services. Please take a few minutes to complete the written survey that you may receive in the mail after your visit with us. Thank you!             Your Updated Medication List - Protect others around you: Learn how to safely use, store and throw away your medicines at www.disposemymeds.org.          This list is accurate as of 4/23/18  3:23 PM.  Always use your most recent med list.                   Brand Name Dispense Instructions for use Diagnosis    acetaminophen 325 MG tablet    TYLENOL    100 tablet    Take 2 tablets (650 mg) by mouth every 6 hours as needed for mild pain        aspirin 81 MG chewable tablet      Take 81 mg by mouth    CHF NYHA class III (symptoms with mildly strenuous activities), chronic, systolic (H), PAD (peripheral artery disease) (H)       bumetanide 1 MG tablet    BUMEX    90 tablet    Take 2 tablets (2 mg) by mouth daily    CHF NYHA class III (symptoms with mildly strenuous activities), chronic, systolic (H), PAD (peripheral artery disease) (H)       cholecalciferol 1000 UNIT tablet    vitamin D3    90    1 TABLET DAILY    Hypovitaminosis D       clopidogrel 75 MG tablet    PLAVIX    90 tablet    Take 1 tablet (75 mg) by mouth daily To reduce risk of stroke    CVD (cerebrovascular disease)       divalproex sodium extended-release 250 MG 24 hr tablet    DEPAKOTE ER    120 tablet    Take 1 tablet (250 mg) by mouth 2 times daily    Moderate major depression (H)       donepezil 5 MG tablet    ARIcept    90 tablet    TAKE ONE TABLET BY MOUTH AT BEDTIME    Short-term memory loss       metoprolol succinate 100 MG 24 hr tablet    TOPROL-XL    90 tablet    TAKE 1 TABLET (100 MG) BY MOUTH DAILY FOR HEART AND BLOOD PRESSURE    LVH (left ventricular hypertrophy)       omeprazole 20 MG CR capsule    priLOSEC    90 capsule    TAKE 1 CAPSULE (20 MG) BY MOUTH DAILY     Gastroesophageal reflux disease without esophagitis       REFRESH 1 % ophthalmic solution   Generic drug:  carboxymethylcellulose      1 drop 3 times daily.        simvastatin 40 MG tablet    ZOCOR    90 tablet    Take 1 tablet (40 mg) by mouth At Bedtime .  Due for cholesterol check.    Hyperlipidemia LDL goal <70

## 2018-04-23 NOTE — NURSING NOTE
"Chief Complaint   Patient presents with     Heart Problem       Initial /63 (BP Location: Right arm, Patient Position: Chair, Cuff Size: Adult Regular)  Pulse 70  Temp 97.2  F (36.2  C) (Oral)  SpO2 98% Estimated body mass index is 23.22 kg/(m^2) as calculated from the following:    Height as of 2/13/17: 5' 2.5\" (1.588 m).    Weight as of 11/29/17: 129 lb (58.5 kg).  Medication Reconciliation: complete   Yasmeen Chaudhary MA      "

## 2018-04-23 NOTE — LETTER
[facility]  [address] [place]  [address]  [phone]        April 27, 2018    Justin Doan  [address] [place] [address]        Dear Justin,    Labs are stable    Results for orders placed or performed in visit on 04/23/18   Basic metabolic panel   Result Value Ref Range    Sodium 141 133 - 144 mmol/L    Potassium 4.4 3.4 - 5.3 mmol/L    Chloride 108 94 - 109 mmol/L    Carbon Dioxide 27 20 - 32 mmol/L    Anion Gap 6 3 - 14 mmol/L    Glucose 100 (H) 70 - 99 mg/dL    Urea Nitrogen 28 7 - 30 mg/dL    Creatinine 1.06 0.66 - 1.25 mg/dL    GFR Estimate 66 >60 mL/min/1.7m2    GFR Estimate If Black 80 >60 mL/min/1.7m2    Calcium 8.7 8.5 - 10.1 mg/dL   CBC with platelets and differential   Result Value Ref Range    WBC 6.0 4.0 - 11.0 10e9/L    RBC Count 4.06 (L) 4.4 - 5.9 10e12/L    Hemoglobin 11.8 (L) 13.3 - 17.7 g/dL    Hematocrit 37.7 (L) 40.0 - 53.0 %    MCV 93 78 - 100 fl    MCH 29.1 26.5 - 33.0 pg    MCHC 31.3 (L) 31.5 - 36.5 g/dL    RDW 18.9 (H) 10.0 - 15.0 %    Platelet Count 116 (L) 150 - 450 10e9/L    Diff Method Automated Method     % Neutrophils 49.5 %    % Lymphocytes 32.8 %    % Monocytes 14.2 %    % Eosinophils 3.2 %    % Basophils 0.3 %    Absolute Neutrophil 3.0 1.6 - 8.3 10e9/L    Absolute Lymphocytes 2.0 0.8 - 5.3 10e9/L    Absolute Monocytes 0.9 0.0 - 1.3 10e9/L    Absolute Eosinophils 0.2 0.0 - 0.7 10e9/L    Absolute Basophils 0.0 0.0 - 0.2 10e9/L   Hepatic panel (Albumin, ALT, AST, Bili, Alk Phos, TP)   Result Value Ref Range    Bilirubin Direct <0.1 0.0 - 0.2 mg/dL    Bilirubin Total 0.2 0.2 - 1.3 mg/dL    Albumin 3.3 (L) 3.4 - 5.0 g/dL    Protein Total 6.9 6.8 - 8.8 g/dL    Alkaline Phosphatase 71 40 - 150 U/L    ALT 14 0 - 70 U/L    AST 17 0 - 45 U/L   LDL cholesterol direct   Result Value Ref Range    LDL Cholesterol Direct 70 <100 mg/dL       If you have any questions please call the clinic at [phone].    Sincerely,    Arturo Ware  MD [facility]

## 2018-04-23 NOTE — PROGRESS NOTES
SUBJECTIVE:   Justin Doan is a 85 year old male who presents to clinic today for the following health issues:    Vascular Disease Follow-up:  PAD      Chest pain or pressure, left side neck or arm pain: No    Shortness of breath/increased sweats/nausea with exertion: No    Pain in calves walking 1-2 blocks: No    Worsened or new symptoms since last visit: No    Nitroglycerin use: no    Daily aspirin use: Yes    ER vist: [facility] due to fluid in the lungs.      Amount of exercise or physical activity: None    Problems taking medications regularly: No    Medication side effects: none    Diet: regular (no restrictions)            Problem list and histories reviewed & adjusted, as indicated.  Additional history: as documented    Patient Active Problem List   Diagnosis     COPD (chronic obstructive pulmonary disease) (H)     Renal artery stenosis (H)     PAD (peripheral artery disease) (H)     GERD (gastroesophageal reflux disease)     Insulin resistance     Tobacco abuse     Advanced directives, counseling/discussion     Hyperlipidemia LDL goal <70     CVD (cerebrovascular disease)     Health Care Home     Thrombocytopenia (H)     S/P carotid endarterectomy     Pseudophakia, ou     Idiopathic thrombocytopenic purpura (H)     Abdominal aortic aneurysm (H)     Occlusion of superior mesenteric artery (H)     HTN, goal below 150/90     Short-term memory loss     Posterior vitreous detachment, bilateral     Conjunctival cyst, left     Moderate major depression (H)     Chronic diastolic congestive heart failure (H)     Past Surgical History:   Procedure Laterality Date     CATARACT IOL, RT/LT       ENDARTERECTOMY CAROTID  12/21/2012    Procedure: ENDARTERECTOMY CAROTID;  Left Carotid  Endarterectomy ;  Surgeon: Gio Villatoro MD;  Location: [facility] OR     ENT SURGERY      nose surgery to open things up     PHACOEMULSIFICATION WITH STANDARD INTRAOCULAR LENS IMPLANT  10/2013    right eye; left eye     REPLACE VALVE  AORTIC MINIMALLY INVASIVE  2/14/2012    Procedure:REPLACE VALVE AORTIC MINIMALLY INVASIVE; Minimally Invasive Aortic Valve Replacement on pump oxygenator , Trans-Esophageal Cardiac Echogram performed by ; Surgeon:EDEL SALAZAR; Location: OR  --- used porcine      VASCULAR SURGERY  03/12    stent placed in superior mesenteric artery for acute ischemia       Social History   Substance Use Topics     Smoking status: Current Every Day Smoker     Packs/day: 0.50     Years: 60.00     Types: Cigarettes     Smokeless tobacco: Never Used      Comment: 7 -10 cigs a day     Alcohol use No      Comment: rarely; consumed heavy use prior     Family History   Problem Relation Age of Onset     HEART DISEASE Father      mi age 72     HEART DISEASE Brother      MI age 44         Current Outpatient Prescriptions   Medication Sig Dispense Refill     acetaminophen (TYLENOL) 325 MG tablet Take 2 tablets (650 mg) by mouth every 6 hours as needed for mild pain 100 tablet 0     aspirin 81 MG chewable tablet Take 81 mg by mouth       bumetanide (BUMEX) 1 MG tablet Take 2 tablets (2 mg) by mouth daily 90 tablet 3     carboxymethylcellulose (REFRESH) 1 % ophthalmic solution 1 drop 3 times daily.       clopidogrel (PLAVIX) 75 MG tablet Take 1 tablet (75 mg) by mouth daily To reduce risk of stroke 90 tablet 3     divalproex (DEPAKOTE ER) 250 MG 24 hr tablet Take 1 tablet (250 mg) by mouth 2 times daily 120 tablet 3     donepezil (ARICEPT) 5 MG tablet TAKE ONE TABLET BY MOUTH AT BEDTIME 90 tablet 1     metoprolol (TOPROL-XL) 100 MG 24 hr tablet TAKE 1 TABLET (100 MG) BY MOUTH DAILY FOR HEART AND BLOOD PRESSURE 90 tablet 3     omeprazole (PRILOSEC) 20 MG CR capsule TAKE 1 CAPSULE (20 MG) BY MOUTH DAILY 90 capsule 1     simvastatin (ZOCOR) 40 MG tablet Take 1 tablet (40 mg) by mouth At Bedtime .  Due for cholesterol check. 90 tablet 4     VITAMIN D 1000 UNIT OR TABS 1 TABLET DAILY 90 3     No Known Allergies  Recent Labs   Lab  Test  04/23/18   1524  10/19/17   1443  08/04/17   1155  02/13/17   0928  02/10/16   1313  06/04/15   1005   08/14/13   0739   11/14/12   1018  08/14/12   1004   01/26/12   1430   09/13/10   0956   A1C   --    --    --    --   5.9   --    --    --    --    --   5.9   --   5.8   < >   --    LDL  70   --    --   67  62   --    < >  79   < >  71  75   --    --    < >   --    HDL   --    --    --   54   --    --    --   38*   --   37*   --    --    --    < >   --    TRIG   --    --    --   86   --    --    --   69   --   84   --    --    --    < >   --    ALT  14  19   --   20   --    --    < >  21   --   17  7   < >   --    < >   --    CR  1.06   --   0.95  0.92  0.97  0.97   < >  1.05   < >  0.95  1.02   < >   --    < >   --    GFRESTIMATED  66   --   75  78  74  74   < >  68   < >  76  70   < >   --    < >   --    GFRESTBLACK  80   --   >90   GFR Calc    >90   GFR Calc    90  90   < >  82   < >  >90  85   < >   --    < >   --    POTASSIUM  4.4   --   3.8  4.3  4.3  4.4   < >  5.1   < >  4.2  5.0   < >   --    < >   --    TSH   --    --    --    --    --   0.77   --    --    --    --    --    --    --    --   0.47    < > = values in this interval not displayed.      BP Readings from Last 3 Encounters:   04/23/18 125/63   11/29/17 130/73   10/19/17 118/57    Wt Readings from Last 3 Encounters:   11/29/17 129 lb (58.5 kg)   08/04/17 135 lb (61.2 kg)   05/15/17 135 lb (61.2 kg)                    Reviewed and updated as needed this visit by clinical staff  Tobacco  Allergies  Meds  Med Hx  Surg Hx  Fam Hx  Soc Hx      Reviewed and updated as needed this visit by Provider         ROS:  Constitutional, HEENT, cardiovascular, pulmonary, gi and gu systems are negative, except as otherwise noted.    OBJECTIVE:     /63 (BP Location: Right arm, Patient Position: Chair, Cuff Size: Adult Regular)  Pulse 70  Temp 97.2  F (36.2  C) (Oral)  SpO2 98%  There is no height or weight on file  to calculate BMI.  GENERAL: healthy, alert and no distress  EYES: Eyes grossly normal to inspection, PERRL and conjunctivae and sclerae normal  HENT: ear canals and TM's normal, nose and mouth without ulcers or lesions  NECK: no adenopathy, no asymmetry, masses, or scars and thyroid normal to palpation  RESP: lungs clear to auscultation - no rales, rhonchi or wheezes  CV: regular rate and rhythm, normal S1 S2, no S3 or S4, no murmur, click or rub, no peripheral edema and peripheral pulses strong  ABDOMEN: soft, nontender, no hepatosplenomegaly, no masses and bowel sounds normal  MS: no gross musculoskeletal defects noted, no edema  SKIN: no suspicious lesions or rashes  NEURO: Normal strength and tone, mentation intact and speech normal  BACK: no CVA tenderness, no paralumbar tenderness  PSYCH: mentation appears normal, affect normal/bright  LYMPH: no cervical, supraclavicular, axillary, or inguinal adenopathy    Diagnostic Test Results:  Results for orders placed or performed in visit on 04/23/18   Basic metabolic panel   Result Value Ref Range    Sodium 141 133 - 144 mmol/L    Potassium 4.4 3.4 - 5.3 mmol/L    Chloride 108 94 - 109 mmol/L    Carbon Dioxide 27 20 - 32 mmol/L    Anion Gap 6 3 - 14 mmol/L    Glucose 100 (H) 70 - 99 mg/dL    Urea Nitrogen 28 7 - 30 mg/dL    Creatinine 1.06 0.66 - 1.25 mg/dL    GFR Estimate 66 >60 mL/min/1.7m2    GFR Estimate If Black 80 >60 mL/min/1.7m2    Calcium 8.7 8.5 - 10.1 mg/dL   CBC with platelets and differential   Result Value Ref Range    WBC 6.0 4.0 - 11.0 10e9/L    RBC Count 4.06 (L) 4.4 - 5.9 10e12/L    Hemoglobin 11.8 (L) 13.3 - 17.7 g/dL    Hematocrit 37.7 (L) 40.0 - 53.0 %    MCV 93 78 - 100 fl    MCH 29.1 26.5 - 33.0 pg    MCHC 31.3 (L) 31.5 - 36.5 g/dL    RDW 18.9 (H) 10.0 - 15.0 %    Platelet Count 116 (L) 150 - 450 10e9/L    Diff Method Automated Method     % Neutrophils 49.5 %    % Lymphocytes 32.8 %    % Monocytes 14.2 %    % Eosinophils 3.2 %    % Basophils 0.3  %    Absolute Neutrophil 3.0 1.6 - 8.3 10e9/L    Absolute Lymphocytes 2.0 0.8 - 5.3 10e9/L    Absolute Monocytes 0.9 0.0 - 1.3 10e9/L    Absolute Eosinophils 0.2 0.0 - 0.7 10e9/L    Absolute Basophils 0.0 0.0 - 0.2 10e9/L   Hepatic panel (Albumin, ALT, AST, Bili, Alk Phos, TP)   Result Value Ref Range    Bilirubin Direct <0.1 0.0 - 0.2 mg/dL    Bilirubin Total 0.2 0.2 - 1.3 mg/dL    Albumin 3.3 (L) 3.4 - 5.0 g/dL    Protein Total 6.9 6.8 - 8.8 g/dL    Alkaline Phosphatase 71 40 - 150 U/L    ALT 14 0 - 70 U/L    AST 17 0 - 45 U/L   LDL cholesterol direct   Result Value Ref Range    LDL Cholesterol Direct 70 <100 mg/dL       ASSESSMENT/PLAN:       ICD-10-CM    1. Idiopathic thrombocytopenic purpura (H) D69.3 Basic metabolic panel     CBC with platelets and differential     Hepatic panel (Albumin, ALT, AST, Bili, Alk Phos, TP)   2. HTN, goal below 150/90 I10 Basic metabolic panel     CBC with platelets and differential     Hepatic panel (Albumin, ALT, AST, Bili, Alk Phos, TP)   3. Chronic diastolic congestive heart failure (H) I50.32 Basic metabolic panel     CBC with platelets and differential     Hepatic panel (Albumin, ALT, AST, Bili, Alk Phos, TP)   4. PAD (peripheral artery disease) (H) I73.9 Basic metabolic panel     CBC with platelets and differential     Hepatic panel (Albumin, ALT, AST, Bili, Alk Phos, TP)     LDL cholesterol direct   5. Late onset Alzheimer's disease without behavioral disturbance G30.1     F02.80        Arturo Ware MD  [facility]

## 2018-05-03 ENCOUNTER — TELEPHONE (OUTPATIENT)
Dept: FAMILY MEDICINE | Facility: CLINIC | Age: 83
End: 2018-05-03

## 2018-05-03 NOTE — TELEPHONE ENCOUNTER
Reason for Call:  Form, our goal is to have forms completed with 72 hours, however, some forms may require a visit or additional information.    Type of letter, form or note:  medical    Who is the form from?: Home care    Where did the form come from: Patient or family brought in       What clinic location was the form placed at?: [facility])    Where the form was placed: 's Box    What number is listed as a contact on the form?: [phone]       Additional comments: patient wants to  completed form.    Call taken on 5/3/2018 at 8:11 AM by Heydi Emmanuel

## 2018-05-03 NOTE — TELEPHONE ENCOUNTER
Date forms received: 5-3-18  Form completed as much as possible by Heydi Emmanuel.  Forms placed: provider desk Date placed: 5-3-18  Heydi Emmanuel

## 2018-05-04 PROBLEM — F02.80 DEMENTIA DUE TO MEDICAL CONDITION WITHOUT BEHAVIORAL DISTURBANCE (H): Status: ACTIVE | Noted: 2018-05-04

## 2018-05-04 NOTE — TELEPHONE ENCOUNTER
Date forms retrieved from team basket: 18  Were forms completed/signed:  yes  Form was sent to:  via: .  Did patient request to be contacted when forms were complete: yes   Patient was contacted via: phone  Date: 18  Date sent to [email or web address]  Heydi Emmanuel

## 2018-05-17 ENCOUNTER — TELEPHONE (OUTPATIENT)
Dept: FAMILY MEDICINE | Facility: CLINIC | Age: 83
End: 2018-05-17

## 2018-05-17 NOTE — TELEPHONE ENCOUNTER
Reason for Call: Request for an order or referral:    Order or referral being requested: Miri Red calling stating that they have not received any medication orders for patient. They are requesting that a signed medication list be faxed to them at [phone].    Date needed: as soon as possible    Has the patient been seen by the PCP for this problem? Not Applicable    Additional comments:     Phone number Patient can be reached at:  Other phone number:  [phone]    Best Time:  any    Can we leave a detailed message on this number?  YES    Call taken on 5/17/2018 at 10:08 AM by Miri Beckett

## 2018-05-24 ENCOUNTER — TELEPHONE (OUTPATIENT)
Dept: FAMILY MEDICINE | Facility: CLINIC | Age: 83
End: 2018-05-24

## 2018-05-24 NOTE — TELEPHONE ENCOUNTER
Forms received from: [employer]   Phone number listed: [phone]   Fax listed: [phone]  Date received: 5-24-18  Form description: med orders  Once forms are completed, please return to [employer] via fax.  Is patient requesting to be contacted when forms are completed: n/a  Form placed: provider desk  Heydi Emmanuel

## 2018-06-01 ENCOUNTER — TELEPHONE (OUTPATIENT)
Dept: FAMILY MEDICINE | Facility: CLINIC | Age: 83
End: 2018-06-01

## 2018-06-01 NOTE — TELEPHONE ENCOUNTER
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

## 2018-06-01 NOTE — TELEPHONE ENCOUNTER
Reason for Call:  Other call back    Detailed comments: Miri, nurse at [facility], is calling to speak with Dr. Ware's nurse. She state patient has been getting more agitated lately, he's being moved and has been hitting staff with his cane. Miri looked at his chart and saw that he is on Depakote 2x per day and is wondering if they could get an order for 3x per day to help with his agitation. They would need that faxed to [phone]. Please all Miri to discuss    Phone Number Patient can be reached at: Other phone number:  [phone]*    Best Time: asap    Can we leave a detailed message on this number? YES    Call taken on 6/1/2018 at 3:22 PM by José Antonio Noonan

## 2018-06-01 NOTE — TELEPHONE ENCOUNTER
Notified Miri at [facility] of the orders below.    She would like them faxed to [phone]    Faxed to above number.    Routed to PCP as an FYI.    Angelika Booth RN [facility] Triage.

## 2018-06-01 NOTE — LETTER
[address] [address]  [phone]    Justin Doan  11/14/1932    Ok to increase Depakote ER to 250 mg po TID from June 4, 2018 going forward.           GUILLERMO RESENDIZ MD       June 4, 2018

## 2018-06-01 NOTE — TELEPHONE ENCOUNTER
Called Miri nurse at [facility].  She stated that patient is very agitated and is trying to hit people with his cane.  He just moved and has been more agitated since.    He is also trying to hit people in general when they are trying to help him.    He is currently taking Depokote 250 mg ER BID.    They are wondering what else they can give patient for this or increase med dose to TID.    They are wanting this taken care of today.    Routed to covering providers.        If new script is needed call nurse back with orders and fax script to them.  Angelika Booth RN [facility] Triage.

## 2018-06-01 NOTE — LETTER
6/1/18    Regarding Justin Miller   Birthdate [birth year]          Increase dose of Depakote ER  to tid over the weekend   They should contact Dr Power next week about how he is doing and to let him know the dose was changed       Dr. Mercer

## 2018-07-31 ENCOUNTER — RECORDS - HEALTHEAST (OUTPATIENT)
Dept: LAB | Facility: CLINIC | Age: 83
End: 2018-07-31

## 2018-07-31 LAB
ALBUMIN SERPL-MCNC: 2.9 G/DL (ref 3.5–5)
ALP SERPL-CCNC: 73 U/L (ref 45–120)
ALT SERPL W P-5'-P-CCNC: 18 U/L (ref 0–45)
ANION GAP SERPL CALCULATED.3IONS-SCNC: 10 MMOL/L (ref 5–18)
AST SERPL W P-5'-P-CCNC: 19 U/L (ref 0–40)
BILIRUB SERPL-MCNC: 0.3 MG/DL (ref 0–1)
BUN SERPL-MCNC: 30 MG/DL (ref 8–28)
CALCIUM SERPL-MCNC: 8.8 MG/DL (ref 8.5–10.5)
CHLORIDE BLD-SCNC: 104 MMOL/L (ref 98–107)
CO2 SERPL-SCNC: 31 MMOL/L (ref 22–31)
CREAT SERPL-MCNC: 1.61 MG/DL (ref 0.7–1.3)
ERYTHROCYTE [DISTWIDTH] IN BLOOD BY AUTOMATED COUNT: 15.9 % (ref 11–14.5)
GFR SERPL CREATININE-BSD FRML MDRD: 41 ML/MIN/1.73M2
GLUCOSE BLD-MCNC: 85 MG/DL (ref 70–125)
HCT VFR BLD AUTO: 37.6 % (ref 40–54)
HGB BLD-MCNC: 11.7 G/DL (ref 14–18)
LIPASE SERPL-CCNC: 8 U/L (ref 0–52)
MCH RBC QN AUTO: 30.3 PG (ref 27–34)
MCHC RBC AUTO-ENTMCNC: 31.1 G/DL (ref 32–36)
MCV RBC AUTO: 97 FL (ref 80–100)
PLATELET # BLD AUTO: 108 THOU/UL (ref 140–440)
PMV BLD AUTO: 13 FL (ref 8.5–12.5)
POTASSIUM BLD-SCNC: 3.7 MMOL/L (ref 3.5–5)
PROT SERPL-MCNC: 6.2 G/DL (ref 6–8)
RBC # BLD AUTO: 3.86 MILL/UL (ref 4.4–6.2)
SODIUM SERPL-SCNC: 145 MMOL/L (ref 136–145)
WBC: 7.3 THOU/UL (ref 4–11)

## 2018-09-27 ENCOUNTER — TELEPHONE (OUTPATIENT)
Dept: FAMILY MEDICINE | Facility: CLINIC | Age: 83
End: 2018-09-27

## 2018-10-10 ENCOUNTER — RECORDS - HEALTHEAST (OUTPATIENT)
Dept: LAB | Facility: CLINIC | Age: 83
End: 2018-10-10

## 2018-10-10 LAB
ANION GAP SERPL CALCULATED.3IONS-SCNC: 8 MMOL/L (ref 5–18)
BUN SERPL-MCNC: 22 MG/DL (ref 8–28)
CALCIUM SERPL-MCNC: 9.1 MG/DL (ref 8.5–10.5)
CHLORIDE BLD-SCNC: 105 MMOL/L (ref 98–107)
CO2 SERPL-SCNC: 31 MMOL/L (ref 22–31)
CREAT SERPL-MCNC: 1.36 MG/DL (ref 0.7–1.3)
GFR SERPL CREATININE-BSD FRML MDRD: 50 ML/MIN/1.73M2
GLUCOSE BLD-MCNC: 54 MG/DL (ref 70–125)
POTASSIUM BLD-SCNC: 4.4 MMOL/L (ref 3.5–5)
SODIUM SERPL-SCNC: 144 MMOL/L (ref 136–145)

## 2018-10-26 ENCOUNTER — RECORDS - HEALTHEAST (OUTPATIENT)
Dept: LAB | Facility: CLINIC | Age: 83
End: 2018-10-26

## 2018-10-26 LAB
ANION GAP SERPL CALCULATED.3IONS-SCNC: 8 MMOL/L (ref 5–18)
BASOPHILS # BLD AUTO: 0.1 THOU/UL (ref 0–0.2)
BASOPHILS NFR BLD AUTO: 1 % (ref 0–2)
BUN SERPL-MCNC: 31 MG/DL (ref 8–28)
CALCIUM SERPL-MCNC: 9.9 MG/DL (ref 8.5–10.5)
CHLORIDE BLD-SCNC: 102 MMOL/L (ref 98–107)
CO2 SERPL-SCNC: 34 MMOL/L (ref 22–31)
CREAT SERPL-MCNC: 1.66 MG/DL (ref 0.7–1.3)
EOSINOPHIL # BLD AUTO: 0 THOU/UL (ref 0–0.4)
EOSINOPHIL NFR BLD AUTO: 0 % (ref 0–6)
ERYTHROCYTE [DISTWIDTH] IN BLOOD BY AUTOMATED COUNT: 15.3 % (ref 11–14.5)
GFR SERPL CREATININE-BSD FRML MDRD: 40 ML/MIN/1.73M2
GLUCOSE BLD-MCNC: 123 MG/DL (ref 70–125)
HCT VFR BLD AUTO: 42.6 % (ref 40–54)
HGB BLD-MCNC: 13.2 G/DL (ref 14–18)
LYMPHOCYTES # BLD AUTO: 2.9 THOU/UL (ref 0.8–4.4)
LYMPHOCYTES NFR BLD AUTO: 27 % (ref 20–40)
MCH RBC QN AUTO: 32.4 PG (ref 27–34)
MCHC RBC AUTO-ENTMCNC: 31 G/DL (ref 32–36)
MCV RBC AUTO: 104 FL (ref 80–100)
MONOCYTES # BLD AUTO: 1.9 THOU/UL (ref 0–0.9)
MONOCYTES NFR BLD AUTO: 17 % (ref 2–10)
NEUTROPHILS # BLD AUTO: 5.8 THOU/UL (ref 2–7.7)
NEUTROPHILS NFR BLD AUTO: 54 % (ref 50–70)
PLATELET # BLD AUTO: 125 THOU/UL (ref 140–440)
PMV BLD AUTO: 12.7 FL (ref 8.5–12.5)
POTASSIUM BLD-SCNC: 4.2 MMOL/L (ref 3.5–5)
RBC # BLD AUTO: 4.08 MILL/UL (ref 4.4–6.2)
SODIUM SERPL-SCNC: 144 MMOL/L (ref 136–145)
WBC: 10.7 THOU/UL (ref 4–11)

## 2018-11-08 ENCOUNTER — PATIENT OUTREACH (OUTPATIENT)
Dept: CARE COORDINATION | Facility: CLINIC | Age: 83
End: 2018-11-08

## 2018-11-08 NOTE — PROGRESS NOTES
Clinic Care Coordination Contact  Care Team Conversations    The patient was hospitalized at [facility] for sepsis.  The patient had come from a memory care unit.  The family made the decision to have the patient discharged to home with hospice.        Care Coordination to remain available for the patient to contact in the event of future needs. No follow up planned at this time.       Gerald Uribe, MSN, RN, PHN I Clinic Care Coordinator  [facility]  Phone: [phone]  [email or web address]

## 2018-11-08 NOTE — PATIENT INSTRUCTIONS
DC'd from [facility] on 11/6/18 to home health   Primary Problem: Sepsis   LACE Score: 63

## 2019-03-15 ENCOUNTER — DOCUMENTATION ONLY (OUTPATIENT)
Dept: OPHTHALMOLOGY | Facility: CLINIC | Age: 84
End: 2019-03-15

## 2021-10-19 PROBLEM — F32.9 MAJOR DEPRESSION: Status: ACTIVE | Noted: 2018-01-10

## 2022-09-08 NOTE — TELEPHONE ENCOUNTER
Forms received from: [facility]   Phone number listed: [phone]   Fax listed: [phone]  Date received: 09/27/18  Form description: Orders-OT   Once forms are completed, please return to [facility] via Fax.  Is patient requesting to be contacted when forms are completed: NA    Form placed: in providers pradeep Horne     Discharged